# Patient Record
Sex: FEMALE | Race: WHITE | NOT HISPANIC OR LATINO | Employment: OTHER | URBAN - METROPOLITAN AREA
[De-identification: names, ages, dates, MRNs, and addresses within clinical notes are randomized per-mention and may not be internally consistent; named-entity substitution may affect disease eponyms.]

---

## 2017-07-14 LAB
ABO GROUP BLD: NORMAL
BLD GP AB SCN SERPL QL: NORMAL
C TRACH DNA SPEC QL NAA+PROBE: NORMAL
HBV SURFACE AG SERPL QL IA: NORMAL
HCT VFR BLD AUTO: NORMAL %
HGB BLD-MCNC: NORMAL G/DL
HIV 1 0 2 IC ZHVIC: NORMAL
N GONORRHOEA DNA SPEC QL NAA+PROBE: NORMAL
PLATELET # BLD AUTO: NORMAL 10*3/UL
RH BLD: NORMAL
RPR SER QL: NON REACTIVE
RUBV IGG SERPL IA-ACNC: NORMAL

## 2017-10-17 ENCOUNTER — INITIAL PRENATAL (OUTPATIENT)
Dept: OBGYN | Facility: CLINIC | Age: 27
End: 2017-10-17
Payer: MEDICAID

## 2017-10-17 VITALS
BODY MASS INDEX: 24.27 KG/M2 | WEIGHT: 137 LBS | DIASTOLIC BLOOD PRESSURE: 48 MMHG | SYSTOLIC BLOOD PRESSURE: 106 MMHG | HEIGHT: 63 IN

## 2017-10-17 DIAGNOSIS — Z34.90 ENCOUNTER FOR SUPERVISION OF NORMAL PREGNANCY, ANTEPARTUM, UNSPECIFIED GRAVIDITY: Primary | ICD-10-CM

## 2017-10-17 DIAGNOSIS — M62.08 RECTUS DIASTASIS: ICD-10-CM

## 2017-10-17 LAB
APPEARANCE UR: NORMAL
BILIRUB UR STRIP-MCNC: NORMAL MG/DL
COLOR UR AUTO: NORMAL
GLUCOSE UR STRIP.AUTO-MCNC: NEGATIVE MG/DL
KETONES UR STRIP.AUTO-MCNC: NEGATIVE MG/DL
LEUKOCYTE ESTERASE UR QL STRIP.AUTO: NORMAL
NITRITE UR QL STRIP.AUTO: NEGATIVE
PH UR STRIP.AUTO: 6.5 [PH] (ref 5–8)
PROT UR QL STRIP: NORMAL MG/DL
RBC UR QL AUTO: NEGATIVE
SP GR UR STRIP.AUTO: 1.02
UROBILINOGEN UR STRIP-MCNC: NORMAL MG/DL

## 2017-10-17 PROCEDURE — 90471 IMMUNIZATION ADMIN: CPT | Performed by: NURSE PRACTITIONER

## 2017-10-17 PROCEDURE — 59401 PR NEW OB VISIT: CPT | Performed by: NURSE PRACTITIONER

## 2017-10-17 PROCEDURE — 81002 URINALYSIS NONAUTO W/O SCOPE: CPT | Performed by: NURSE PRACTITIONER

## 2017-10-17 PROCEDURE — 90715 TDAP VACCINE 7 YRS/> IM: CPT | Performed by: NURSE PRACTITIONER

## 2017-10-17 ASSESSMENT — ENCOUNTER SYMPTOMS
EYES NEGATIVE: 1
PSYCHIATRIC NEGATIVE: 1
MUSCULOSKELETAL NEGATIVE: 1
NEUROLOGICAL NEGATIVE: 1
CARDIOVASCULAR NEGATIVE: 1
GASTROINTESTINAL NEGATIVE: 1
RESPIRATORY NEGATIVE: 1
CONSTITUTIONAL NEGATIVE: 1

## 2017-10-17 NOTE — LETTER
Cystic Fibrosis Carrier Testing  Celestina Welch    The following information is about a blood test that can be done to determine if you and/or your partner carry the gene for cystic fibrosis.    WHAT IS CYSTIC FIBROSIS?  · Cystic fibrosis (CF) is an inherited disease that affects more than 25,000 [place] children and young adults.  · Symptoms of CF vary but include lung congestion, pneumonia, diarrhea and poor growth.  Most people with CF have severe medical problems and some die at a young age.  Others have so few symptoms they are unaware they have CF.  · CF does not affect intelligence.  · Although there is no cure for CF at this time, scientists are making progress in improving treatment and in searching for a cure.  In the past many people with CF  at a very young age.  Today, many are living into their 20’s and 30’s.    IS THERE A CHANCE MY BABY COULD HAVE CYSTIC FIBROSIS?  · You can have a child with CF even if there is no history in your family (see chart below).  · CF testing can help determine if you are a carrier and at risk to have a child with CF.  Note: if both parents are carriers, there is a 1 in 4 (25%) chance with each pregnancy that they will have a child with CF.  · Carriers have one normal CF gene and one altered CF gene.  · People with CF have two altered CF genes.  · Most people have two normal copies of the CF gene.    Approximate risk that a couple with no family history of cystic fibrosis will have a child with cystic fibrosis:    Ethnic background / Risk     couple:  1 in 2,500   couple:  1 in 15,000            couple:  1 in 8,000     American couple:  1 in 32,000     WHAT TESTING IS AVAILABLE?  · There is a blood test that can be done to find out if you or your partner is a carrier.  · It is important to understand that CF carrier testing does not detect all CF carriers.  · If the test shows that you are both CF carriers, you unborn baby  can be tested to find out if the baby has CF.    HOW MUCH DOES IT COST TO HAVE CYSTIC FIBROSIS CARRIER TESTING?  · Cost and insurance coverage for CF carrier testing vary depending upon the laboratory used and your insurance policy.  · The average cost for CF carrier testing is $300 per person.  · Your genetic counselor can provide you with more information about cystic fibrosis carrier testing.    _____  Yes, I am interested in discussing carrier testing with a genetic counselor.    _____  No, I am not interested in CF carrier testing or in receiving more information about CF carrier testing.      Client signature: ________________________________________  10/17/2017

## 2017-10-17 NOTE — PROGRESS NOTES
S:  Celestina Welch is a 26 y.o.  who presents for her new OB exam.  She is 30w6d with and LC of Estimated Date of Delivery: 17 based off of US . She has no complaints.  She is currently working at home, self-employed. 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 Discussed heavy lifting and chemical exposure. No ER visits. Had care previously with [facility], and was last seen 10/3/2017.     US at 22w3d was grossly normal, but there was question about fetal head size, and recommendation was made by previous physician to follow up for growth. Spoke to Dr Galvez, who is the attending in Clinic today, and she agrees. Growth US ordered to be done at approx 32 weeks.    She has been having constant abdominal burning sensation from her epigastric area to her umbilicus x 2 weeks. Last MD felt that it was heartburn and prescribed tums and zantac. No relief. She says it feels like the muscles are pulling and tearing. Pain is worse in the evenings after she has been up all day long. Ice/cool helps. Exam shows 1.5 fingerbreadth's of diastasis. Comfort measures discussed.    Completed AFP, awaiting records.  Declined CF.  Denies VB, LOF, or cramping.  Denies dysuria, vaginal DC. Reports good fetal movement.     Pt is  and lives with  and kids.  Pregnancy is unplanned but desired.    First 2 pregnancies were uncomplicated full term vaginal deliveries. Largest baby was 7lb1oz.     Third pregnancy was a SAB which she got an infection and needed a D&C for. No problems afterward.      Discussed diet and exercise during pregnancy. Encouraged good nutrition, and daily exercise including walking or swimming. Discussed expected weight gain during pregnancy. Discussed adequate hydration during pregnancy.    Discussed clinic policies and procedures. Educated about number of providers, prenatal visit schedule, and where to go in the event of emergency or need for  "care.    Past Medical History:   Diagnosis Date   • Patellar tendonitis      Family History   Problem Relation Age of Onset   • Diabetes Mother    • Other Father      Social History     Social History   • Marital status:      Spouse name: N/A   • Number of children: N/A   • Years of education: N/A     Occupational History   • Not on file.     Social History Main Topics   • Smoking status: Never Smoker   • Smokeless tobacco: Never Used      Comment: occassional    • Alcohol use No      Comment: occassional    • Drug use: No   • Sexual activity: Yes     Partners: Male     Birth control/ protection: Pill      Comment: Unplanned pregnancy     Other Topics Concern   • Not on file     Social History Narrative   • No narrative on file     OB History    Para Term  AB Living   3 2 2   1 2   SAB TAB Ectopic Molar Multiple Live Births   1       1 2      # Outcome Date GA Lbr Betito/2nd Weight Sex Delivery Anes PTL Lv   3A Term 12 40w0d  3.218 kg (7 lb 1.5 oz) M Vag-Spont EPI N DOTTY      Birth Comments: Pt states no complications   3B Current            2 SAB 09/17/15 10w0d    SAB         Birth Comments: Had D&C   1 Term 14 40w0d  2.977 kg (6 lb 9 oz) F Vag-Spont EPI N DOTTY      Birth Comments: Pt states no complications          History of Varicella Virus: yes  History of HSV I or II in self or partner: no  History of Thyroid problems: no    O:  Blood pressure 106/48, height 1.6 m (5' 3\"), weight 62.1 kg (137 lb), last menstrual period 2017, unknown if currently breastfeeding.   See Prenatal Physical.    Wet mount: Deferred, no s/sx      A:   1.  IUP @ 30w6d per US at 6w1d        2.  S=D        3.  See problem list below        4.  US shows ?small HC/BPD measurements, will repeat growth scan.       Patient Active Problem List    Diagnosis Date Noted   • Normal labor and delivery 2012         P:  1.  GC/CT & pap done at previous clinic        2.  Prenatal labs ordered - lab slip given     " "   3.  Discussed PNV, diet, avoidances and adequate water intake        4.  NOB packet given        5.  Return to office in 2 wks        6.  Complete OB US done previously, growth scan ordered         7.  Tdap today    No orders of the defined types were placed in this encounter.      HPI    Review of Systems   Constitutional: Negative.    HENT: Negative.    Eyes: Negative.    Respiratory: Negative.    Cardiovascular: Negative.    Gastrointestinal: Negative.    Genitourinary: Negative.    Musculoskeletal: Negative.    Skin: Negative.    Neurological: Negative.    Endo/Heme/Allergies: Negative.    Psychiatric/Behavioral: Negative.    All other systems reviewed and are negative.         Objective:     /48   Ht 1.6 m (5' 3\")   Wt 62.1 kg (137 lb)   LMP 02/14/2017   Breastfeeding? Unknown   BMI 24.27 kg/m²      Physical Exam   Constitutional: She is oriented to person, place, and time. She appears well-developed and well-nourished.   HENT:   Head: Normocephalic and atraumatic.   Nose: Nose normal.   Eyes: Conjunctivae and EOM are normal.   Neck: Normal range of motion. Neck supple.   Cardiovascular: Normal rate, regular rhythm, normal heart sounds and intact distal pulses.    Pulmonary/Chest: Effort normal and breath sounds normal.   Abdominal: Soft. Bowel sounds are normal.   Is having constant burning abdominal pain. Not associated with fever, chills, nausea, vomiting, diarrhea, or constipation. Feels like the muscles are tearing apart. Pain is worse in the evenings after she has been moving around all day.  On exam, she has diastasis measuring 1.5 fingerbreadth's.   Genitourinary: Vagina normal. Uterus is enlarged.   Musculoskeletal: Normal range of motion.   Neurological: She is alert and oriented to person, place, and time. She has normal reflexes.   Skin: Skin is warm and dry. Capillary refill takes less than 2 seconds.   Psychiatric: She has a normal mood and affect. Her behavior is normal. Judgment " and thought content normal.   Nursing note and vitals reviewed.       Assessment/Plan:     1. Encounter for supervision of normal pregnancy, antepartum, unspecified   LC 2017 per 6w1d US  - POCT Urinalysis  - GLUCOSE 1HR GESTATIONAL; Future  - HCT; Future  - HGB; Future  - T.PALLIDUM AB EIA; Future  - TDAP VACCINE =>8YO IM  - US-OB LIMITED GROWTH FOLLOW UP; Future

## 2017-10-17 NOTE — PROGRESS NOTES
Pt here today for NOB visit. Transfer of care from [facility].  LMP: Unknown  WT:137 lb  HT: 106/48  Pt states she has been having stomach pain every day x 3 weeks. States no other concerns.   1 hr gtt. H/H, and T.Pallidum lab slip given today with instructions  NEO sheet given and explained today  Flu vaccine offered today. Pt declines.  Desires Tdap vaccine  Declines BTL  Good #[phone]    Tdap vaccine given. Right Deltoid. VIS given and screening check list reviewed with pt.

## 2017-10-26 ENCOUNTER — APPOINTMENT (OUTPATIENT)
Dept: RADIOLOGY | Facility: IMAGING CENTER | Age: 27
End: 2017-10-26
Attending: NURSE PRACTITIONER
Payer: MEDICAID

## 2017-10-26 ENCOUNTER — HOSPITAL ENCOUNTER (OUTPATIENT)
Dept: LAB | Facility: MEDICAL CENTER | Age: 27
End: 2017-10-26
Attending: NURSE PRACTITIONER
Payer: MEDICAID

## 2017-10-26 DIAGNOSIS — Z34.90 ENCOUNTER FOR SUPERVISION OF NORMAL PREGNANCY, ANTEPARTUM, UNSPECIFIED GRAVIDITY: ICD-10-CM

## 2017-10-26 LAB
GLUCOSE 1H P 50 G GLC PO SERPL-MCNC: 110 MG/DL (ref 70–139)
HCT VFR BLD AUTO: 34 % (ref 37–47)
HGB BLD-MCNC: 11.2 G/DL (ref 12–16)
TREPONEMA PALLIDUM IGG+IGM AB [PRESENCE] IN SERUM OR PLASMA BY IMMUNOASSAY: NON REACTIVE

## 2017-10-26 PROCEDURE — 36415 COLL VENOUS BLD VENIPUNCTURE: CPT

## 2017-10-26 PROCEDURE — 85018 HEMOGLOBIN: CPT

## 2017-10-26 PROCEDURE — 82950 GLUCOSE TEST: CPT

## 2017-10-26 PROCEDURE — 86780 TREPONEMA PALLIDUM: CPT

## 2017-10-26 PROCEDURE — 76816 OB US FOLLOW-UP PER FETUS: CPT | Performed by: OBSTETRICS & GYNECOLOGY

## 2017-10-26 PROCEDURE — 85014 HEMATOCRIT: CPT

## 2017-10-31 ENCOUNTER — DATING (OUTPATIENT)
Dept: OBGYN | Facility: CLINIC | Age: 27
End: 2017-10-31

## 2017-11-01 ENCOUNTER — ROUTINE PRENATAL (OUTPATIENT)
Dept: OBGYN | Facility: CLINIC | Age: 27
End: 2017-11-01
Payer: MEDICAID

## 2017-11-01 VITALS — WEIGHT: 141 LBS | SYSTOLIC BLOOD PRESSURE: 112 MMHG | BODY MASS INDEX: 24.98 KG/M2 | DIASTOLIC BLOOD PRESSURE: 72 MMHG

## 2017-11-01 DIAGNOSIS — Z34.80 SUPERVISION OF OTHER NORMAL PREGNANCY, ANTEPARTUM: Primary | ICD-10-CM

## 2017-11-01 PROCEDURE — 90040 PR PRENATAL FOLLOW UP: CPT | Performed by: NURSE PRACTITIONER

## 2017-11-01 ASSESSMENT — PATIENT HEALTH QUESTIONNAIRE - PHQ9: CLINICAL INTERPRETATION OF PHQ2 SCORE: 0

## 2017-11-01 NOTE — PROGRESS NOTES
Ob f/u. + fetal movement baby is moving ok   No VB, LOF or contractions   C/O ebony and jan  Phone number # [phone]  Pharmacy verified with patient  WT= 141 lbs             UP=956/72  Pt refuse flu shot

## 2017-11-01 NOTE — PROGRESS NOTES
S) Pt is a 26 y.o.   at 33w0d  gestation. Routine prenatal care today. Still having some pain from the rectus diastasis. She is getting some relief from pregnancy clothing with some support. Discussed comfort measures again, and will refer to surgeon after delivery if still present.   Fetal movement Normal  Cramping no,  VB no  LOF no   Denies dysuria. Generally feels well today. Good self-care activities identified. Denies headaches, swelling, visual changes, or epigastric pain .     O) Blood pressure 112/72, weight 64 kg (141 lb), last menstrual period 2017, unknown if currently breastfeeding.        Labs:       PNL: WNL       GCT: 110       AFP: Not done       GBS: N/A       Pertinent ultrasound -        See media, all WNL. Growth US last week WNl, DAYLIN 19.04cm, c/w dating      A) IUP at 33w0d       S=D         Patient Active Problem List    Diagnosis Date Noted   • Encounter for supervision of normal pregnancy, antepartum 10/17/2017   • Rectus diastasis 10/17/2017                 TDAP: yes       FLU: no        BTL: no       : n/a    P) s/s ptl vs general discomforts. Fetal movements reviewed. General ed and anticipatory guidance. Nutrition/exercise/vitamin. Plans breast Plans pp contraception-  had vasectomy  Continue PNV.

## 2017-11-15 ENCOUNTER — ROUTINE PRENATAL (OUTPATIENT)
Dept: OBGYN | Facility: CLINIC | Age: 27
End: 2017-11-15
Payer: MEDICAID

## 2017-11-15 ENCOUNTER — HOSPITAL ENCOUNTER (OUTPATIENT)
Facility: MEDICAL CENTER | Age: 27
End: 2017-11-15
Attending: NURSE PRACTITIONER
Payer: MEDICAID

## 2017-11-15 VITALS — SYSTOLIC BLOOD PRESSURE: 102 MMHG | DIASTOLIC BLOOD PRESSURE: 68 MMHG | BODY MASS INDEX: 25.33 KG/M2 | WEIGHT: 143 LBS

## 2017-11-15 DIAGNOSIS — Z34.80 SUPERVISION OF OTHER NORMAL PREGNANCY, ANTEPARTUM: Primary | ICD-10-CM

## 2017-11-15 DIAGNOSIS — Z34.80 SUPERVISION OF OTHER NORMAL PREGNANCY, ANTEPARTUM: ICD-10-CM

## 2017-11-15 PROCEDURE — 90040 PR PRENATAL FOLLOW UP: CPT | Performed by: NURSE PRACTITIONER

## 2017-11-15 PROCEDURE — 87653 STREP B DNA AMP PROBE: CPT

## 2017-11-15 NOTE — PROGRESS NOTES
Pt. here for Ob f/u and GBS today. Good # [phone]  Good FM  Pt states was having [place] last night along with nausea and dizziness.   Pharmacy verified.

## 2017-11-15 NOTE — PROGRESS NOTES
S) Pt is a 26 y.o.   at 35w0d  gestation. Routine prenatal care today. Complains of strong contractions and N/V last night. Has since resolved itself. GBS collected today. Still uncomfortable with her abdominal muscles pulling. Discussed possible IOL close to 40 weeks and then referral to surgeon after pregnancy for abdominoplasty if desired/needed. All questions answered.  Fetal movement Normal  Cramping yes,  VB no  LOF no   Denies dysuria. Generally feels well today. Good self-care activities identified. Denies headaches, swelling, visual changes, or epigastric pain .     O) Last menstrual period 2017, unknown if currently breastfeeding.        Labs:       PNL: WNL       GCT: 110       AFP: Not done       GBS: Collected today       Pertinent ultrasound -        17- Done at [facility] (see media), all WNL, DAYLIN WNL, c/w prev dating.  10/26/17- Recheck fetal growth (head)- Survey WNL, DAYLIN 19.04cm, c/w prev dating.    A) IUP at 35w0d       S=D         Patient Active Problem List    Diagnosis Date Noted   • Encounter for supervision of normal pregnancy, antepartum 10/17/2017   • Rectus diastasis 10/17/2017                 TDAP: yes       FLU: no        BTL: no       : n/a    P) s/s ptl vs general discomforts. Fetal movements reviewed. General ed and anticipatory guidance. Nutrition/exercise/vitamin. Plans breast Plans pp contraception-  had vasectomy  Continue PNV.

## 2017-11-16 LAB — GP B STREP DNA SPEC QL NAA+PROBE: POSITIVE

## 2017-11-17 PROBLEM — O99.820 GBS (GROUP B STREPTOCOCCUS CARRIER), +RV CULTURE, CURRENTLY PREGNANT: Status: ACTIVE | Noted: 2017-11-17

## 2017-11-22 ENCOUNTER — ROUTINE PRENATAL (OUTPATIENT)
Dept: OBGYN | Facility: CLINIC | Age: 27
End: 2017-11-22
Payer: MEDICAID

## 2017-11-22 VITALS — SYSTOLIC BLOOD PRESSURE: 102 MMHG | DIASTOLIC BLOOD PRESSURE: 68 MMHG | WEIGHT: 144 LBS | BODY MASS INDEX: 25.51 KG/M2

## 2017-11-22 DIAGNOSIS — O99.820 GBS (GROUP B STREPTOCOCCUS CARRIER), +RV CULTURE, CURRENTLY PREGNANT: ICD-10-CM

## 2017-11-22 PROCEDURE — 90040 PR PRENATAL FOLLOW UP: CPT | Performed by: NURSE PRACTITIONER

## 2017-11-22 NOTE — PROGRESS NOTES
Ob f/u. + fetal movement baby is moving ok   No VB, LOF or contractions   C/O ebony  and jan   Phone number # [phone]  Pharmacy verified with patient  WT= 144 lbs              BP= 102/68

## 2017-11-22 NOTE — PROGRESS NOTES
S) Pt is a 26 y.o.   at 36w0d  gestation. Routine prenatal care today. Complains of irregular contractions. Is driving a few hours today for Thanksgiving. Precautions discussed, recommend that she take prenatal records with her. Will be back .     Fetal movement Normal  Cramping no,  VB no  LOF no   Denies dysuria. Generally feels well today. Good self-care activities identified. Denies headaches, swelling, visual changes, or epigastric pain .     O) Blood pressure 102/68, weight 65.3 kg (144 lb), last menstrual period 2017, unknown if currently breastfeeding.        Labs:       PNL: WNL       GCT: 110       AFP: Not done       GBS: positive       Pertinent ultrasound -        Done at [facility]- WNL, small head, DAYLIN WNL, c/w dates  10/26/17- Recheck growth and fetal head- all WNL, DAYLIN 19.04cm, c/w prev dating    A) IUP at 36w0d       S=D         Patient Active Problem List    Diagnosis Date Noted   • Encounter for supervision of normal pregnancy, antepartum 10/17/2017     Priority: Medium   • Rectus diastasis 10/17/2017     Priority: Medium   • GBS (group B Streptococcus carrier), +RV culture, currently pregnant 2017                 TDAP: yes       FLU: no        BTL: no       : n/a    P) s/s ptl vs general discomforts. Fetal movements reviewed. General ed and anticipatory guidance. Nutrition/exercise/vitamin. Plans breast Plans pp contraception-  had vasectomy  Continue PNV.

## 2017-11-30 ENCOUNTER — ROUTINE PRENATAL (OUTPATIENT)
Dept: OBGYN | Facility: CLINIC | Age: 27
End: 2017-11-30
Payer: MEDICAID

## 2017-11-30 VITALS — WEIGHT: 144 LBS | BODY MASS INDEX: 25.51 KG/M2 | SYSTOLIC BLOOD PRESSURE: 102 MMHG | DIASTOLIC BLOOD PRESSURE: 64 MMHG

## 2017-11-30 DIAGNOSIS — O99.820 GBS (GROUP B STREPTOCOCCUS CARRIER), +RV CULTURE, CURRENTLY PREGNANT: ICD-10-CM

## 2017-11-30 PROCEDURE — 90040 PR PRENATAL FOLLOW UP: CPT | Performed by: NURSE PRACTITIONER

## 2017-11-30 ASSESSMENT — PATIENT HEALTH QUESTIONNAIRE - PHQ9: CLINICAL INTERPRETATION OF PHQ2 SCORE: 0

## 2017-11-30 NOTE — PROGRESS NOTES
SUBJECTIVE:  Pt is a 26 y.o.   at 37w1d  gestation. Presents today for follow-up prenatal care. Reports no issues at this time.  Reports good  fetal movement. Denies cramping/contractions, bleeding or leaking of fluid. Denies dysuria, headaches, N/V, or other issues at this time. Generally feels well today.     OBJECTIVE:  - See prenatal vitals flow  Vitals:    17 1411   BP: 102/64   Weight: 65.3 kg (144 lb)      - Pertinent Labs: GBS positive   - Pertinent ultrasound: Growth US showed adequate growth            ASSESSMENT:   - IUP at 37w1d by 6  week US   - S=D  Patient Active Problem List    Diagnosis Date Noted   • Encounter for supervision of normal pregnancy, antepartum 10/17/2017     Priority: Medium   • Rectus diastasis 10/17/2017     Priority: Medium   • GBS (group B Streptococcus carrier), +RV culture, currently pregnant 2017         PLAN:  - Encouraged nightly walks  - S/sx pregnancy and labor warning signs vs general discomforts discussed  - Fetal movements and kick counts reviewed   - Adequate hydration reinforced  - Nutrition/exercise/vitamin education: continued PNV  -  s/p vasectomy for contraception Pp: handout given and reviewed  - S/p TDAP vacc  - Declines Flu vacc  - Anticipatory guidance given  - RTC in 1 week for follow-up prenatal care

## 2017-11-30 NOTE — PROGRESS NOTES
Ob f/u. + fetal movement baby is moving ok   No VB, LOF or contractions   C/O ebony and jan   Phone number # [phone]  Pharmacy verified with patient  WT= 144 lbs             CK=175/64

## 2017-12-06 ENCOUNTER — ROUTINE PRENATAL (OUTPATIENT)
Dept: OBGYN | Facility: CLINIC | Age: 27
End: 2017-12-06
Payer: MEDICAID

## 2017-12-06 VITALS — BODY MASS INDEX: 26.22 KG/M2 | DIASTOLIC BLOOD PRESSURE: 60 MMHG | WEIGHT: 148 LBS | SYSTOLIC BLOOD PRESSURE: 114 MMHG

## 2017-12-06 DIAGNOSIS — O99.820 GBS (GROUP B STREPTOCOCCUS CARRIER), +RV CULTURE, CURRENTLY PREGNANT: ICD-10-CM

## 2017-12-06 DIAGNOSIS — Z34.80 SUPERVISION OF OTHER NORMAL PREGNANCY, ANTEPARTUM: Primary | ICD-10-CM

## 2017-12-06 PROCEDURE — 90040 PR PRENATAL FOLLOW UP: CPT | Performed by: NURSE PRACTITIONER

## 2017-12-06 NOTE — PROGRESS NOTES
OB f/u. + fetal movement.  No VB, LOF   Good phone # [phone]  Preferred pharmacy confirmed.  GBS positive  Pt c/o bilateral swollen feet; R leg has been swelling up more than L  Pt reports irregular UC

## 2017-12-06 NOTE — PROGRESS NOTES
S) Pt is a 26 y.o.   at 38w0d  gestation. Routine prenatal care today. Complains of irregular UC's. Also says her legs and feet have been swelling in the evening with the right being more swollen than the left. Denies any pain or heat in either leg. Negative homans bilaterally. No other PIH symptoms. IOL referral placed today and discussed.    Fetal movement Normal  Cramping yes,  VB no  LOF no   Denies dysuria. Generally feels well today. Good self-care activities identified. Denies headaches, swelling, visual changes, or epigastric pain .     O) Blood pressure 114/60, weight 67.1 kg (148 lb), last menstrual period 2017, unknown if currently breastfeeding.        Labs:       PNL: WNL       GCT: 110       AFP: Not done       GBS: positive       Pertinent ultrasound -        First US done at [facility], see media- ?small head circumference.  /- Growth US done for head measurements- Survey WNL, DAYLIN 19.04cm, growth is consistent and no concerns for small head diameter    A) IUP at 38w0d       S=D         Patient Active Problem List    Diagnosis Date Noted   • GBS (group B Streptococcus carrier), +RV culture, currently pregnant 2017     Priority: High   • Encounter for supervision of normal pregnancy, antepartum 10/17/2017     Priority: Medium   • Rectus diastasis 10/17/2017     Priority: Medium                 TDAP: yes       FLU: no        BTL: no       : n/a    P) s/s ptl vs general discomforts. Fetal movements reviewed. General ed and anticipatory guidance. Nutrition/exercise/vitamin. Plans breast Plans pp contraception- - vasectomy  Continue PNV.

## 2017-12-12 ENCOUNTER — ROUTINE PRENATAL (OUTPATIENT)
Dept: OBGYN | Facility: CLINIC | Age: 27
End: 2017-12-12
Payer: MEDICAID

## 2017-12-12 VITALS — BODY MASS INDEX: 26.57 KG/M2 | WEIGHT: 150 LBS | DIASTOLIC BLOOD PRESSURE: 70 MMHG | SYSTOLIC BLOOD PRESSURE: 114 MMHG

## 2017-12-12 DIAGNOSIS — O99.820 GBS (GROUP B STREPTOCOCCUS CARRIER), +RV CULTURE, CURRENTLY PREGNANT: ICD-10-CM

## 2017-12-12 PROCEDURE — 90040 PR PRENATAL FOLLOW UP: CPT | Performed by: PHYSICIAN ASSISTANT

## 2017-12-12 NOTE — PROGRESS NOTES
Pt here for OB/FU Reports Good Fetal Movement.  Pt c/o linnea U/Cs, denies any other complications.   IOL and GEL information given today.   GBS POSITIVE   # [phone]

## 2017-12-12 NOTE — PROGRESS NOTES
Pt has no complaints with cramping, UCs, Vb, LOF, though pt has incr pain in pelvis. +FM. GBS pos. Cervix: 1/50/-2, post, soft, vtx. Labor precautions stressed and Daily FKC and walks recommended. Pt has IOL scheduled on 12/19 at 8am (called L&D to reschedule as pt was hoping to be with Barb). Unable to move OP Gel as too full on 12/18, so will have pt just have IOL on 12/19. Pt given info and aware. RTC prn or to L&D 12/19.

## 2017-12-19 ENCOUNTER — HOSPITAL ENCOUNTER (INPATIENT)
Facility: MEDICAL CENTER | Age: 27
LOS: 2 days | End: 2017-12-21
Attending: OBSTETRICS & GYNECOLOGY | Admitting: OBSTETRICS & GYNECOLOGY
Payer: MEDICAID

## 2017-12-19 DIAGNOSIS — M62.08 RECTUS DIASTASIS: ICD-10-CM

## 2017-12-19 PROBLEM — Z34.90 PREGNANCY: Status: ACTIVE | Noted: 2017-12-19

## 2017-12-19 LAB
BASOPHILS # BLD AUTO: 0.4 % (ref 0–1.8)
BASOPHILS # BLD: 0.04 K/UL (ref 0–0.12)
EOSINOPHIL # BLD AUTO: 0.09 K/UL (ref 0–0.51)
EOSINOPHIL NFR BLD: 0.9 % (ref 0–6.9)
ERYTHROCYTE [DISTWIDTH] IN BLOOD BY AUTOMATED COUNT: 41 FL (ref 35.9–50)
HCT VFR BLD AUTO: 36.3 % (ref 37–47)
HGB BLD-MCNC: 11.9 G/DL (ref 12–16)
HOLDING TUBE BB 8507: NORMAL
IMM GRANULOCYTES # BLD AUTO: 0.13 K/UL (ref 0–0.11)
IMM GRANULOCYTES NFR BLD AUTO: 1.2 % (ref 0–0.9)
LYMPHOCYTES # BLD AUTO: 1.64 K/UL (ref 1–4.8)
LYMPHOCYTES NFR BLD: 15.7 % (ref 22–41)
MCH RBC QN AUTO: 27.6 PG (ref 27–33)
MCHC RBC AUTO-ENTMCNC: 32.8 G/DL (ref 33.6–35)
MCV RBC AUTO: 84.2 FL (ref 81.4–97.8)
MONOCYTES # BLD AUTO: 0.7 K/UL (ref 0–0.85)
MONOCYTES NFR BLD AUTO: 6.7 % (ref 0–13.4)
NEUTROPHILS # BLD AUTO: 7.83 K/UL (ref 2–7.15)
NEUTROPHILS NFR BLD: 75.1 % (ref 44–72)
NRBC # BLD AUTO: 0 K/UL
NRBC BLD-RTO: 0 /100 WBC
PLATELET # BLD AUTO: 285 K/UL (ref 164–446)
PMV BLD AUTO: 10.2 FL (ref 9–12.9)
RBC # BLD AUTO: 4.31 M/UL (ref 4.2–5.4)
WBC # BLD AUTO: 10.4 K/UL (ref 4.8–10.8)

## 2017-12-19 PROCEDURE — 10907ZC DRAINAGE OF AMNIOTIC FLUID, THERAPEUTIC FROM PRODUCTS OF CONCEPTION, VIA NATURAL OR ARTIFICIAL OPENING: ICD-10-PCS | Performed by: OBSTETRICS & GYNECOLOGY

## 2017-12-19 PROCEDURE — 304965 HCHG RECOVERY SERVICES

## 2017-12-19 PROCEDURE — 700102 HCHG RX REV CODE 250 W/ 637 OVERRIDE(OP): Performed by: STUDENT IN AN ORGANIZED HEALTH CARE EDUCATION/TRAINING PROGRAM

## 2017-12-19 PROCEDURE — 36415 COLL VENOUS BLD VENIPUNCTURE: CPT

## 2017-12-19 PROCEDURE — 4A1HX4Z MONITORING OF PRODUCTS OF CONCEPTION, CARDIAC ELECTRICAL ACTIVITY, EXTERNAL APPROACH: ICD-10-PCS | Performed by: OBSTETRICS & GYNECOLOGY

## 2017-12-19 PROCEDURE — A9270 NON-COVERED ITEM OR SERVICE: HCPCS | Performed by: STUDENT IN AN ORGANIZED HEALTH CARE EDUCATION/TRAINING PROGRAM

## 2017-12-19 PROCEDURE — 770002 HCHG ROOM/CARE - OB PRIVATE (112)

## 2017-12-19 PROCEDURE — 700105 HCHG RX REV CODE 258

## 2017-12-19 PROCEDURE — 700111 HCHG RX REV CODE 636 W/ 250 OVERRIDE (IP): Performed by: STUDENT IN AN ORGANIZED HEALTH CARE EDUCATION/TRAINING PROGRAM

## 2017-12-19 PROCEDURE — 3E033VJ INTRODUCTION OF OTHER HORMONE INTO PERIPHERAL VEIN, PERCUTANEOUS APPROACH: ICD-10-PCS | Performed by: OBSTETRICS & GYNECOLOGY

## 2017-12-19 PROCEDURE — 303615 HCHG EPIDURAL/SPINAL ANESTHESIA FOR LABOR

## 2017-12-19 PROCEDURE — 700105 HCHG RX REV CODE 258: Performed by: OBSTETRICS & GYNECOLOGY

## 2017-12-19 PROCEDURE — 59409 OBSTETRICAL CARE: CPT

## 2017-12-19 PROCEDURE — 85025 COMPLETE CBC W/AUTO DIFF WBC: CPT

## 2017-12-19 PROCEDURE — 700111 HCHG RX REV CODE 636 W/ 250 OVERRIDE (IP)

## 2017-12-19 RX ORDER — SODIUM CHLORIDE, SODIUM LACTATE, POTASSIUM CHLORIDE, CALCIUM CHLORIDE 600; 310; 30; 20 MG/100ML; MG/100ML; MG/100ML; MG/100ML
INJECTION, SOLUTION INTRAVENOUS
Status: ACTIVE
Start: 2017-12-19 | End: 2017-12-20

## 2017-12-19 RX ORDER — AMPICILLIN 2 G/1
INJECTION, POWDER, FOR SOLUTION INTRAVENOUS
Status: COMPLETED
Start: 2017-12-19 | End: 2017-12-19

## 2017-12-19 RX ORDER — ROPIVACAINE HYDROCHLORIDE 2 MG/ML
INJECTION, SOLUTION EPIDURAL; INFILTRATION; PERINEURAL
Status: COMPLETED
Start: 2017-12-19 | End: 2017-12-19

## 2017-12-19 RX ORDER — ONDANSETRON 2 MG/ML
4 INJECTION INTRAMUSCULAR; INTRAVENOUS EVERY 6 HOURS PRN
Status: DISCONTINUED | OUTPATIENT
Start: 2017-12-19 | End: 2017-12-21 | Stop reason: HOSPADM

## 2017-12-19 RX ORDER — HYDROCODONE BITARTRATE AND ACETAMINOPHEN 10; 325 MG/1; MG/1
1 TABLET ORAL EVERY 4 HOURS PRN
Status: DISCONTINUED | OUTPATIENT
Start: 2017-12-19 | End: 2017-12-21 | Stop reason: HOSPADM

## 2017-12-19 RX ORDER — ONDANSETRON 4 MG/1
4 TABLET, ORALLY DISINTEGRATING ORAL EVERY 6 HOURS PRN
Status: DISCONTINUED | OUTPATIENT
Start: 2017-12-19 | End: 2017-12-21 | Stop reason: HOSPADM

## 2017-12-19 RX ORDER — AMPICILLIN 2 G/1
2 INJECTION, POWDER, FOR SOLUTION INTRAVENOUS ONCE
Status: COMPLETED | OUTPATIENT
Start: 2017-12-19 | End: 2017-12-19

## 2017-12-19 RX ORDER — MISOPROSTOL 200 UG/1
600 TABLET ORAL
Status: DISCONTINUED | OUTPATIENT
Start: 2017-12-19 | End: 2017-12-21 | Stop reason: HOSPADM

## 2017-12-19 RX ORDER — DOCUSATE SODIUM 100 MG/1
100 CAPSULE, LIQUID FILLED ORAL 2 TIMES DAILY PRN
Status: DISCONTINUED | OUTPATIENT
Start: 2017-12-19 | End: 2017-12-21 | Stop reason: HOSPADM

## 2017-12-19 RX ORDER — OXYCODONE HYDROCHLORIDE AND ACETAMINOPHEN 5; 325 MG/1; MG/1
1 TABLET ORAL EVERY 4 HOURS PRN
Status: DISCONTINUED | OUTPATIENT
Start: 2017-12-19 | End: 2017-12-21 | Stop reason: HOSPADM

## 2017-12-19 RX ORDER — CARBOPROST TROMETHAMINE 250 UG/ML
250 INJECTION, SOLUTION INTRAMUSCULAR
Status: DISCONTINUED | OUTPATIENT
Start: 2017-12-19 | End: 2017-12-21 | Stop reason: HOSPADM

## 2017-12-19 RX ORDER — BUPIVACAINE HYDROCHLORIDE 2.5 MG/ML
INJECTION, SOLUTION EPIDURAL; INFILTRATION; INTRACAUDAL
Status: ACTIVE
Start: 2017-12-19 | End: 2017-12-20

## 2017-12-19 RX ORDER — MISOPROSTOL 200 UG/1
800 TABLET ORAL
Status: DISCONTINUED | OUTPATIENT
Start: 2017-12-19 | End: 2017-12-19 | Stop reason: HOSPADM

## 2017-12-19 RX ORDER — METHYLERGONOVINE MALEATE 0.2 MG/ML
0.2 INJECTION INTRAVENOUS
Status: DISCONTINUED | OUTPATIENT
Start: 2017-12-19 | End: 2017-12-21 | Stop reason: HOSPADM

## 2017-12-19 RX ORDER — CALCIUM CARBONATE 500 MG/1
1000 TABLET, CHEWABLE ORAL 4 TIMES DAILY PRN
Status: DISCONTINUED | OUTPATIENT
Start: 2017-12-19 | End: 2017-12-21 | Stop reason: HOSPADM

## 2017-12-19 RX ORDER — SODIUM CHLORIDE, SODIUM LACTATE, POTASSIUM CHLORIDE, CALCIUM CHLORIDE 600; 310; 30; 20 MG/100ML; MG/100ML; MG/100ML; MG/100ML
INJECTION, SOLUTION INTRAVENOUS
Status: COMPLETED
Start: 2017-12-19 | End: 2017-12-19

## 2017-12-19 RX ORDER — IBUPROFEN 600 MG/1
600 TABLET ORAL EVERY 6 HOURS PRN
Status: DISCONTINUED | OUTPATIENT
Start: 2017-12-19 | End: 2017-12-21 | Stop reason: HOSPADM

## 2017-12-19 RX ORDER — METHYLERGONOVINE MALEATE 0.2 MG/ML
0.2 INJECTION INTRAVENOUS
Status: DISCONTINUED | OUTPATIENT
Start: 2017-12-19 | End: 2017-12-19 | Stop reason: HOSPADM

## 2017-12-19 RX ORDER — ACETAMINOPHEN 325 MG/1
325 TABLET ORAL EVERY 4 HOURS PRN
Status: DISCONTINUED | OUTPATIENT
Start: 2017-12-19 | End: 2017-12-21 | Stop reason: HOSPADM

## 2017-12-19 RX ORDER — AMPICILLIN 1 G/1
1 INJECTION, POWDER, FOR SOLUTION INTRAMUSCULAR; INTRAVENOUS EVERY 4 HOURS
Status: DISCONTINUED | OUTPATIENT
Start: 2017-12-19 | End: 2017-12-19 | Stop reason: HOSPADM

## 2017-12-19 RX ORDER — SODIUM CHLORIDE, SODIUM LACTATE, POTASSIUM CHLORIDE, CALCIUM CHLORIDE 600; 310; 30; 20 MG/100ML; MG/100ML; MG/100ML; MG/100ML
INJECTION, SOLUTION INTRAVENOUS CONTINUOUS
Status: DISCONTINUED | OUTPATIENT
Start: 2017-12-19 | End: 2017-12-21 | Stop reason: HOSPADM

## 2017-12-19 RX ORDER — VITAMIN A ACETATE, BETA CAROTENE, ASCORBIC ACID, CHOLECALCIFEROL, .ALPHA.-TOCOPHEROL ACETATE, DL-, THIAMINE MONONITRATE, RIBOFLAVIN, NIACINAMIDE, PYRIDOXINE HYDROCHLORIDE, FOLIC ACID, CYANOCOBALAMIN, CALCIUM CARBONATE, FERROUS FUMARATE, ZINC OXIDE, CUPRIC OXIDE 3080; 12; 120; 400; 1; 1.84; 3; 20; 22; 920; 25; 200; 27; 10; 2 [IU]/1; UG/1; MG/1; [IU]/1; MG/1; MG/1; MG/1; MG/1; MG/1; [IU]/1; MG/1; MG/1; MG/1; MG/1; MG/1
1 TABLET, FILM COATED ORAL EVERY MORNING
Status: DISCONTINUED | OUTPATIENT
Start: 2017-12-20 | End: 2017-12-21 | Stop reason: HOSPADM

## 2017-12-19 RX ORDER — ALUMINA, MAGNESIA, AND SIMETHICONE 2400; 2400; 240 MG/30ML; MG/30ML; MG/30ML
30 SUSPENSION ORAL EVERY 6 HOURS PRN
Status: DISCONTINUED | OUTPATIENT
Start: 2017-12-19 | End: 2017-12-19 | Stop reason: HOSPADM

## 2017-12-19 RX ORDER — SODIUM CHLORIDE, SODIUM LACTATE, POTASSIUM CHLORIDE, CALCIUM CHLORIDE 600; 310; 30; 20 MG/100ML; MG/100ML; MG/100ML; MG/100ML
INJECTION, SOLUTION INTRAVENOUS PRN
Status: DISCONTINUED | OUTPATIENT
Start: 2017-12-19 | End: 2017-12-21 | Stop reason: HOSPADM

## 2017-12-19 RX ADMIN — ROPIVACAINE HYDROCHLORIDE 100 ML: 2 INJECTION, SOLUTION EPIDURAL; INFILTRATION at 13:58

## 2017-12-19 RX ADMIN — SODIUM CHLORIDE, POTASSIUM CHLORIDE, SODIUM LACTATE AND CALCIUM CHLORIDE: 600; 310; 30; 20 INJECTION, SOLUTION INTRAVENOUS at 14:03

## 2017-12-19 RX ADMIN — SODIUM CHLORIDE, POTASSIUM CHLORIDE, SODIUM LACTATE AND CALCIUM CHLORIDE 1000 ML: 600; 310; 30; 20 INJECTION, SOLUTION INTRAVENOUS at 10:30

## 2017-12-19 RX ADMIN — SODIUM CHLORIDE, POTASSIUM CHLORIDE, SODIUM LACTATE AND CALCIUM CHLORIDE: 600; 310; 30; 20 INJECTION, SOLUTION INTRAVENOUS at 13:52

## 2017-12-19 RX ADMIN — AMPICILLIN SODIUM 1 G: 1 INJECTION, POWDER, FOR SOLUTION INTRAMUSCULAR; INTRAVENOUS at 15:15

## 2017-12-19 RX ADMIN — IBUPROFEN 600 MG: 600 TABLET, FILM COATED ORAL at 20:21

## 2017-12-19 RX ADMIN — Medication 1 MILLI-UNITS/MIN: at 11:04

## 2017-12-19 RX ADMIN — Medication 125 ML/HR: at 20:21

## 2017-12-19 RX ADMIN — AMPICILLIN 2 G: 2 INJECTION, POWDER, FOR SOLUTION INTRAVENOUS at 11:06

## 2017-12-19 RX ADMIN — AMPICILLIN SODIUM 2 G: 2 INJECTION, POWDER, FOR SOLUTION INTRAMUSCULAR; INTRAVENOUS at 11:06

## 2017-12-19 ASSESSMENT — PAIN SCALES - GENERAL
PAINLEVEL_OUTOF10: 0
PAINLEVEL_OUTOF10: 2
PAINLEVEL_OUTOF10: 1

## 2017-12-19 ASSESSMENT — LIFESTYLE VARIABLES
EVER_SMOKED: NEVER
DO YOU DRINK ALCOHOL: NO
ALCOHOL_USE: NO

## 2017-12-19 ASSESSMENT — PATIENT HEALTH QUESTIONNAIRE - PHQ9
SUM OF ALL RESPONSES TO PHQ QUESTIONS 1-9: 0
SUM OF ALL RESPONSES TO PHQ9 QUESTIONS 1 AND 2: 0
2. FEELING DOWN, DEPRESSED, IRRITABLE, OR HOPELESS: NOT AT ALL
1. LITTLE INTEREST OR PLEASURE IN DOING THINGS: NOT AT ALL

## 2017-12-19 ASSESSMENT — COPD QUESTIONNAIRES
DO YOU EVER COUGH UP ANY MUCUS OR PHLEGM?: NO/ONLY WITH OCCASIONAL COLDS OR INFECTIONS
COPD SCREENING SCORE: 0
HAVE YOU SMOKED AT LEAST 100 CIGARETTES IN YOUR ENTIRE LIFE: NO/DON'T KNOW
DURING THE PAST 4 WEEKS HOW MUCH DID YOU FEEL SHORT OF BREATH: NONE/LITTLE OF THE TIME

## 2017-12-19 NOTE — PROGRESS NOTES
1015:  with EDC of 12/2o making her 39.6 presents for elective IOL; denies UCs, LOF, or VB; reports good FM. Admission procedures completed. /  1100: Pitocin started  1350; Epidural placed  1545: Pt feels need to push; C/0  1555; AROM with clr fluid; pt does not feel need to push currently; will labor down  1730: Began pushing with pt; pt does not push adequately d/t dense epidural on left side; requires much coaching  :  of viable female, 8/9 APGAR  : Assisted to bathroom to void; able to void sufficient amount. Transferred to  via w/c in stable condition

## 2017-12-19 NOTE — H&P
History and Physical      Celestina Welch is a 27 y.o. year old female  at 39w6d who presents for elective induction of labor.     Subjective:   positive  For CTXS.   negative Feels pain   negative for LOF  negative for vaginal bleeding.   positive for fetal movement    ROS: A comprehensive review of systems was negative.    Past Medical History:   Diagnosis Date   • Patellar tendonitis    • Rectus diastasis 10/17/2017     History reviewed. No pertinent surgical history.  OB History    Para Term  AB Living   3 2 2   1 2   SAB TAB Ectopic Molar Multiple Live Births   1       1 2      # Outcome Date GA Lbr Betito/2nd Weight Sex Delivery Anes PTL Lv   3A Term 12 40w0d  3.218 kg (7 lb 1.5 oz) M Vag-Spont EPI N DOTTY      Birth Comments: Pt states no complications   3B Current            2 SAB 09/17/15 10w0d    SAB         Birth Comments: Had D&C   1 Term 14 40w0d  2.977 kg (6 lb 9 oz) F Vag-Spont EPI N DOTTY      Birth Comments: Pt states no complications        Social History     Social History   • Marital status:      Spouse name: N/A   • Number of children: N/A   • Years of education: N/A     Occupational History   • Not on file.     Social History Main Topics   • Smoking status: Never Smoker   • Smokeless tobacco: Never Used      Comment: occassional    • Alcohol use No      Comment: occassional    • Drug use: No   • Sexual activity: Yes     Partners: Male     Birth control/ protection: Pill      Comment: Unplanned pregnancy     Other Topics Concern   • Not on file     Social History Narrative   • No narrative on file     Allergies: Patient has no known allergies.    Current Facility-Administered Medications:   •  AMPICILLIN SODIUM 2 G INJ SOLR, , , ,   •  LACTATED RINGERS IV SOLN, , , ,   •  fentaNYL (SUBLIMAZE) injection 50 mcg, 50 mcg, Intravenous, Q HOUR PRN, Jelly Goldstein M.D.  •  fentaNYL (SUBLIMAZE) injection 100 mcg, 100 mcg, Intravenous, Q HOUR PRN, Jelly Goldstein  "M.D.  •  mag hydrox-al hydrox-simeth (MAALOX PLUS ES or MYLANTA DS) suspension 30 mL, 30 mL, Oral, Q6HRS PRN, Jelly Goldstein M.D.  •  ampicillin (OMNIPEN) injection 2 g, 2 g, Intravenous, Once **FOLLOWED BY** ampicillin (OMNIPEN) injection 1 g, 1 g, Intravenous, Q4HRS, Jelly Goldstein M.D.  •  misoprostol (CYTOTEC) tablet 800 mcg, 800 mcg, Rectal, Once PRN, Jelly Goldstein M.D.  •  methylergonovine (METHERGINE) injection 0.2 mg, 0.2 mg, Intramuscular, Once PRN, Jelly Goldstein M.D.  •  oxytocin (PITOCIN) infusion (for induction), 0.5-20 haley-units/min, Intravenous, Continuous, Jelly Goldstein M.D.  •  lactated ringers infusion, , Intravenous, Continuous, Nathalia Diamond M.D.    Prenatal care with [facility] starting at 6w1d with the following problems:  Patient Active Problem List    Diagnosis Date Noted   • GBS (group B Streptococcus carrier), +RV culture, currently pregnant 11/17/2017     Priority: High   • Encounter for supervision of normal pregnancy, antepartum 10/17/2017     Priority: Medium   • Rectus diastasis 10/17/2017     Priority: Medium   • Pregnancy 12/19/2017       Objective:      Blood pressure 122/73, pulse (!) 103, height 1.6 m (5' 3\"), weight 68 kg (150 lb), last menstrual period 02/14/2017, unknown if currently breastfeeding.    General:   no acute distress, alert   Skin:   normal   HEENT:  PERRLA and EOMI   Lungs:   CTA bilateral   Heart:   S1, S2 normal, no murmur, click, rub or gallop, regular rate and rhythm, chest is clear without rales or wheezing, no pedal edema, no JVD, no hepatosplenomegaly   Abdomen:   gravid, NT   EFW:  3500g   Pelvis:  adequate with gynecoid pelvis   FHT:  140 BPM   Uterine Size: S=D   Presentations: Cephalic   Cervix:     Dilation: 2cm    Effacement: 50%    Station:  -2    Consistency: Soft    Position: Middle     Lab Review  Lab:   Blood type: B     Recent Results (from the past 5880 hour(s))   ABO AND RH DETERMINATION    Collection Time: 07/14/17 12:00 AM   Result Value " Ref Range    Rh Grouping Only POS     ABO Grouping Only B    ANTIBODY SCREEN    Collection Time: 07/14/17 12:00 AM   Result Value Ref Range    Antibody Screen Scrn NEG    PLATELET COUNT    Collection Time: 07/14/17 12:00 AM   Result Value Ref Range    Platelet Count 285  [id number]/uL    RUBELLA ABS IGG    Collection Time: 07/14/17 12:00 AM   Result Value Ref Range    Rubella IgG Antibody IMMUNE    GC DNA PROBE    Collection Time: 07/14/17 12:00 AM   Result Value Ref Range    Gc By Dna Probe NEG    CHLAMYDIA DNA PROBE    Collection Time: 07/14/17 12:00 AM   Result Value Ref Range    Chlamydia By Dna Probe NEG    RPR (SYPHILIS)    Collection Time: 07/14/17 12:00 AM   Result Value Ref Range    Rapid Plasma Reagin -Rpr- NON REACTIVE    HCT    Collection Time: 07/14/17 12:00 AM   Result Value Ref Range    Hematocrit 38.9  %    HGB    Collection Time: 07/14/17 12:00 AM   Result Value Ref Range    Hemoglobin 12.4  g/dL    HIV ANTIBODIES    Collection Time: 07/14/17 12:00 AM   Result Value Ref Range    HIV 1,0,2 IC NEG    HEP B SURFACE ANTIGEN    Collection Time: 07/14/17 12:00 AM   Result Value Ref Range    Hepatitis B Surface Antigen NEG    POCT Urinalysis    Collection Time: 10/17/17  1:30 PM   Result Value Ref Range    POC Color  Negative    POC Appearance  Negative    POC Leukocyte Esterase Trace Negative    POC Nitrites Negative Negative    POC Urobiligen  Negative (0.2) mg/dL    POC Protein Trace Negative mg/dL    POC Urine PH 6.5 5.0 - 8.0    POC Blood Negative Negative    POC Specific Gravity 1.025 <1.005 - >1.030    POC Ketones Negative Negative mg/dL    POC Biliruben  Negative mg/dL    POC Glucose Negative Negative mg/dL   GLUCOSE 1HR GESTATIONAL    Collection Time: 10/26/17 12:23 PM   Result Value Ref Range    Glucose, Post Dose 110 70 - 139 mg/dL   HCT    Collection Time: 10/26/17 12:23 PM   Result Value Ref Range    Hematocrit 34.0 (L) 37.0 - 47.0 %   HGB    Collection Time: 10/26/17 12:23 PM   Result Value Ref  Range    Hemoglobin 11.2 (L) 12.0 - 16.0 g/dL   T.PALLIDUM AB EIA    Collection Time: 10/26/17 12:23 PM   Result Value Ref Range    Syphilis, Treponemal Qual Non Reactive Non Reactive   GRP B STREP, BY PCR ([facility])    Collection Time: 11/15/17 10:11 AM   Result Value Ref Range    Strep Gp B DNA PCR POSITIVE (A) Negative        Assessment:   Celestina Welch at 39w6d  Labor status: Not in labor  Elective induction of labor  Obstetrical history significant for   Patient Active Problem List    Diagnosis Date Noted   • GBS (group B Streptococcus carrier), +RV culture, currently pregnant 2017     Priority: High   • Encounter for supervision of normal pregnancy, antepartum 10/17/2017     Priority: Medium   • Rectus diastasis 10/17/2017     Priority: Medium   • Pregnancy 2017   .      Plan:     Admit to L&D, PNL within normal limits  GBS positive; Ampicillin ppx  IOL with Pitocin as cervix favorable  Anticipate

## 2017-12-19 NOTE — CARE PLAN
Problem: Pain  Goal: Alleviation of Pain or a reduction in pain to the patient's comfort goal  Outcome: PROGRESSING AS EXPECTED  Pt desires epidural when in labor; pt isn't having pain at this time    Problem: Risk for Infection, Impaired Wound Healing  Goal: Remain free from signs and symptoms of infection  Outcome: PROGRESSING AS EXPECTED  Membranes intact; no signs of infection

## 2017-12-20 PROBLEM — Z34.90 PREGNANCY: Status: RESOLVED | Noted: 2017-12-19 | Resolved: 2017-12-20

## 2017-12-20 PROBLEM — Z34.90 ENCOUNTER FOR SUPERVISION OF NORMAL PREGNANCY, ANTEPARTUM: Status: RESOLVED | Noted: 2017-10-17 | Resolved: 2017-12-20

## 2017-12-20 LAB
ERYTHROCYTE [DISTWIDTH] IN BLOOD BY AUTOMATED COUNT: 41 FL (ref 35.9–50)
HCT VFR BLD AUTO: 30.6 % (ref 37–47)
HGB BLD-MCNC: 9.9 G/DL (ref 12–16)
MCH RBC QN AUTO: 26.9 PG (ref 27–33)
MCHC RBC AUTO-ENTMCNC: 32.4 G/DL (ref 33.6–35)
MCV RBC AUTO: 83.2 FL (ref 81.4–97.8)
PLATELET # BLD AUTO: 229 K/UL (ref 164–446)
PMV BLD AUTO: 10.1 FL (ref 9–12.9)
RBC # BLD AUTO: 3.68 M/UL (ref 4.2–5.4)
WBC # BLD AUTO: 15.5 K/UL (ref 4.8–10.8)

## 2017-12-20 PROCEDURE — 770002 HCHG ROOM/CARE - OB PRIVATE (112)

## 2017-12-20 PROCEDURE — A9270 NON-COVERED ITEM OR SERVICE: HCPCS | Performed by: STUDENT IN AN ORGANIZED HEALTH CARE EDUCATION/TRAINING PROGRAM

## 2017-12-20 PROCEDURE — 700102 HCHG RX REV CODE 250 W/ 637 OVERRIDE(OP): Performed by: NURSE PRACTITIONER

## 2017-12-20 PROCEDURE — 85027 COMPLETE CBC AUTOMATED: CPT

## 2017-12-20 PROCEDURE — 700102 HCHG RX REV CODE 250 W/ 637 OVERRIDE(OP): Performed by: STUDENT IN AN ORGANIZED HEALTH CARE EDUCATION/TRAINING PROGRAM

## 2017-12-20 PROCEDURE — A9270 NON-COVERED ITEM OR SERVICE: HCPCS | Performed by: OBSTETRICS & GYNECOLOGY

## 2017-12-20 PROCEDURE — 36415 COLL VENOUS BLD VENIPUNCTURE: CPT

## 2017-12-20 PROCEDURE — 700102 HCHG RX REV CODE 250 W/ 637 OVERRIDE(OP): Performed by: OBSTETRICS & GYNECOLOGY

## 2017-12-20 PROCEDURE — A9270 NON-COVERED ITEM OR SERVICE: HCPCS | Performed by: NURSE PRACTITIONER

## 2017-12-20 RX ORDER — FERROUS SULFATE 325(65) MG
325 TABLET ORAL
Status: DISCONTINUED | OUTPATIENT
Start: 2017-12-20 | End: 2017-12-21 | Stop reason: HOSPADM

## 2017-12-20 RX ORDER — OXYCODONE HYDROCHLORIDE AND ACETAMINOPHEN 5; 325 MG/1; MG/1
1 TABLET ORAL EVERY 4 HOURS PRN
Qty: 15 TAB | Refills: 0 | Status: SHIPPED | OUTPATIENT
Start: 2017-12-20 | End: 2018-01-20

## 2017-12-20 RX ORDER — IBUPROFEN 800 MG/1
800 TABLET ORAL EVERY 8 HOURS PRN
Qty: 30 TAB | Refills: 0 | Status: SHIPPED | OUTPATIENT
Start: 2017-12-20 | End: 2023-03-30

## 2017-12-20 RX ORDER — FERROUS SULFATE 325(65) MG
325 TABLET ORAL
Qty: 30 TAB | Refills: 3 | Status: SHIPPED | OUTPATIENT
Start: 2017-12-20 | End: 2023-03-30

## 2017-12-20 RX ADMIN — ACETAMINOPHEN 325 MG: 325 TABLET, FILM COATED ORAL at 07:19

## 2017-12-20 RX ADMIN — ACETAMINOPHEN 325 MG: 325 TABLET, FILM COATED ORAL at 00:16

## 2017-12-20 RX ADMIN — Medication 1 TABLET: at 07:56

## 2017-12-20 RX ADMIN — IBUPROFEN 600 MG: 600 TABLET, FILM COATED ORAL at 11:34

## 2017-12-20 RX ADMIN — IBUPROFEN 600 MG: 600 TABLET, FILM COATED ORAL at 02:23

## 2017-12-20 RX ADMIN — IBUPROFEN 600 MG: 600 TABLET, FILM COATED ORAL at 19:19

## 2017-12-20 RX ADMIN — ACETAMINOPHEN 325 MG: 325 TABLET, FILM COATED ORAL at 14:56

## 2017-12-20 RX ADMIN — Medication 325 MG: at 07:56

## 2017-12-20 ASSESSMENT — PAIN SCALES - GENERAL
PAINLEVEL_OUTOF10: 2
PAINLEVEL_OUTOF10: 3
PAINLEVEL_OUTOF10: 1
PAINLEVEL_OUTOF10: 3
PAINLEVEL_OUTOF10: 4
PAINLEVEL_OUTOF10: 3
PAINLEVEL_OUTOF10: 3
PAINLEVEL_OUTOF10: 0

## 2017-12-20 NOTE — PROGRESS NOTES
0718- Bedside report received from CHERISE Gil.  Patient denied needs.  0756- Patient assessment done.  Patient stated that she is voiding without difficulty and passing flatus.  Patient denied dizziness and stated that she is walking without difficulty.  Discussed pain management plan and patient prefers to call for pain intervention as needed.  Patient given abdominal binder as previously ordered.  Reviewed plan of care.  4372- Patient shown [facility] Transition to Home video and stated she has no questions at this time.

## 2017-12-20 NOTE — CONSULTS
Breast fed both older children, reports abundant milk supply, did have plugs and mastitis with each prior breastfeeding experience. Discussed prevention and monitoring for plugs and mastitis and recommended exclusive frequent on-demand breastfeeding without pumping for the first 2-3 weeks to establish appropriate milk supply for baby. Parents report breastfeeding is going very well so far and deny need for any assistance with position or latch, they deny any other questions at this time. Lactation will follow if needed.

## 2017-12-20 NOTE — DISCHARGE SUMMARY
Discharge Summary:      Celestina Welch      Admit Date:   2017  Discharge Date:  2017     Admitting diagnosis:  Pregnancy  Pregnancy  Labor and delivery, indication for care  Discharge Diagnosis: Status post vaginal, spontaneous.  Pregnancy Complications: group B strep (treated)  Tubal Ligation:  N\A        History:  Past Medical History:   Diagnosis Date   • Patellar tendonitis    • Rectus diastasis 10/17/2017     OB History    Para Term  AB Living   3 2 2   1 2   SAB TAB Ectopic Molar Multiple Live Births   1       1 2      # Outcome Date GA Lbr Betito/2nd Weight Sex Delivery Anes PTL Lv   3A Term 12 40w0d  3.218 kg (7 lb 1.5 oz) M Vag-Spont EPI N DOTTY      Birth Comments: Pt states no complications   3B Current            2 SAB 09/17/15 10w0d    SAB         Birth Comments: Had D&C   1 Term 14 40w0d  2.977 kg (6 lb 9 oz) F Vag-Spont EPI N DOTTY      Birth Comments: Pt states no complications           Patient has no known allergies.  Patient Active Problem List    Diagnosis Date Noted   • GBS (group B Streptococcus carrier), +RV culture, currently pregnant 2017     Priority: High   • Rectus diastasis 10/17/2017     Priority: Medium   • Postpartum care and examination of lactating mother 2017        Hospital Course:   27 y.o. , now para 3, was admitted with the above mentioned diagnosis, underwent Induction of Labor, vaginal, spontaneous. Patient postpartum course was unremarkable, with progressive advancement in diet , ambulation and toleration of oral analgesia. Patient without complaints today and desires discharge.      Vitals:    17 1914 17 2100 17 0000 17 0400   BP: 127/81 116/70 115/68 (!) 88/59   Pulse: 84 (!) 101 75 89   Resp:  18 16 16   Temp: 36.9 °C (98.5 °F) 37.1 °C (98.7 °F) 36.9 °C (98.4 °F) 36.7 °C (98 °F)   TempSrc:       SpO2:  94% 92% 95%   Weight:       Height:           Current Facility-Administered  Medications   Medication Dose   • ferrous sulfate tablet 325 mg  325 mg   • ondansetron (ZOFRAN ODT) dispertab 4 mg  4 mg    Or   • ondansetron (ZOFRAN) syringe/vial injection 4 mg  4 mg   • oxytocin (PITOCIN) infusion (for postpartum)  2,000 mL/hr    Followed by   • oxytocin (PITOCIN) infusion (for postpartum)   mL/hr   • ibuprofen (MOTRIN) tablet 600 mg  600 mg   • acetaminophen (TYLENOL) tablet 325 mg  325 mg   • oxycodone-acetaminophen (PERCOCET) 5-325 MG per tablet 1 Tab  1 Tab   • hydrocodone/acetaminophen (NORCO)  MG per tablet 1 Tab  1 Tab   • lactated ringers infusion     • calcium carbonate (TUMS) chewable tab 1,000 mg  1,000 mg   • LR infusion     • PRN oxytocin (PITOCIN) (20 Units/1000 mL) PRN for excessive uterine bleeding - See Admin Instr  125-999 mL/hr   • misoprostol (CYTOTEC) tablet 600 mcg  600 mcg   • methylergonovine (METHERGINE) injection 0.2 mg  0.2 mg   • carboPROST (HEMABATE) injection 250 mcg  250 mcg   • docusate sodium (COLACE) capsule 100 mg  100 mg   • prenatal plus vitamin (STUARTNATAL 1+1) 27-1 MG tablet 1 Tab  1 Tab       Exam:  Breast Exam: negative  Abdomen: Abdomen soft, non-tender. BS normal. No masses,  No organomegaly  Fundus Non Tender: yes  Incision: none  Perineum: perineum intact  Extremity: extremities, peripheral pulses and reflexes normal, no edema, redness or tenderness in the calves or thighs, feet normal, good pulses, normal color, temperature and sensation     Labs:  Recent Labs      12/19/17   1030  12/20/17   0506   WBC  10.4  15.5*   RBC  4.31  3.68*   HEMOGLOBIN  11.9*  9.9*   HEMATOCRIT  36.3*  30.6*   MCV  84.2  83.2   MCH  27.6  26.9*   MCHC  32.8*  32.4*   RDW  41.0  41.0   PLATELETCT  285  229   MPV  10.2  10.1        Activity:   Discharge to home  Pelvic Rest x 6 weeks    Assessment:  normal postpartum course  Discharge Assessment: Taking adequate diet and fluids, no heavy bleeding or foul discharge     Follow up: .[facility] in 5 weeks for  vaginal     Discharge Meds:   Current Outpatient Prescriptions   Medication Sig Dispense Refill   • oxycodone-acetaminophen (PERCOCET) 5-325 MG Tab Take 1 Tab by mouth every four hours as needed (for Moderate Pain (Pain Scale 4-6) after delivery) for up to 15 doses. 15 Tab 0   • ibuprofen (MOTRIN) 800 MG Tab Take 1 Tab by mouth every 8 hours as needed (For cramping after delivery; do not give if patient is receiving ketorolac (Toradol)). 30 Tab 0   • ferrous sulfate 325 (65 Fe) MG tablet Take 1 Tab by mouth every morning with breakfast. 30 Tab 3     GBS positive- adequate treatment during labor  Desires discharge- no birth control needed  Pt need to RT [facility] or ER if any of the following occur:  Fever over 100.5  Severe abd pain  Red streaks or painful masses in the breasts  Foul smelling d/c or lochia  Heavy vaginal bleeding saturating a pad per hour  S/s of PP depression    KAYLEIGH LeeN.M.

## 2017-12-20 NOTE — DELIVERY NOTE
Vaginal Delivery Note    Celestina Welch is a  2, now para 3, who presented not in labor at 39w6d.  Patient progressed in active labor with pitocin augmentation/induction to cervical exam 100%; cervical dilation 10; station 0 to complete the first stage of labor.  Patient then progressed through second stage and delivered spontaneously a viable female infant over an intact perineum.  Nuchal cord present.  Infant Apgar 8 and 9, and weight pending.    During the third stage the placenta was delivered spontaneously and was intact with 3 vessels    Assisted extraction:  none    Perineum repair:  none    Analgesia: none    Epidural:  yes    Family support:  yes    Infant bonding:  excellent    Estimated blood loss:  200 mL    Sponge count correct:  yes    Patient tolerated the procedure:  excellent

## 2017-12-20 NOTE — PROGRESS NOTES
1029- Dr. Goldstein notified that the patient desires to stay until tomorrow due to her infant not being discharged secondary to patient's GBBS positive status.  Telephone order received to cancel discharge.

## 2017-12-20 NOTE — PROGRESS NOTES
Post Partum Progress Note    Name:   Celestina Welch   Date/Time:  12/20/2017 - 6:25 AM  Chief Admitting Dx:  Pregnancy  Pregnancy  Labor and delivery, indication for care  Delivery Type:  vaginal, spontaneous  Post-Op/Post Partum Days #:  1    Subjective:  Abdominal pain: no  Ambulating:   yes  Tolerating liquids:  yes  Tolerating food:  yes common adult  Flatus:   no  BM:    no  Bleeding:   without any bleeding  Voiding:   yes  Dizziness:   no  Feeding:   breast    Vitals:    12/19/17 1914 12/19/17 2100 12/20/17 0000 12/20/17 0400   BP: 127/81 116/70 115/68 (!) 88/59   Pulse: 84 (!) 101 75 89   Resp:  18 16 16   Temp: 36.9 °C (98.5 °F) 37.1 °C (98.7 °F) 36.9 °C (98.4 °F) 36.7 °C (98 °F)   TempSrc:       SpO2:  94% 92% 95%   Weight:       Height:           Exam:  Breast: Tenderness no, Engorged no and Lactating no  Abdomen: Abdomen soft, non-tender. BS normal. No masses,  No organomegaly  Fundal Tenderness:  no  Fundus Firm: yes  Incision: none  Below umbilicus: N\A  Perineum: perineum intact  Lochia: mild  Extremities: Normal, trace extremities, peripheral pulses and reflexes normal, no edema, redness or tenderness in the calves or thighs, feet normal, good pulses, normal color, temperature and sensation    Meds:  Current Facility-Administered Medications   Medication Dose   • ondansetron (ZOFRAN ODT) dispertab 4 mg  4 mg    Or   • ondansetron (ZOFRAN) syringe/vial injection 4 mg  4 mg   • oxytocin (PITOCIN) infusion (for postpartum)  2,000 mL/hr    Followed by   • oxytocin (PITOCIN) infusion (for postpartum)   mL/hr   • ibuprofen (MOTRIN) tablet 600 mg  600 mg   • acetaminophen (TYLENOL) tablet 325 mg  325 mg   • oxycodone-acetaminophen (PERCOCET) 5-325 MG per tablet 1 Tab  1 Tab   • hydrocodone/acetaminophen (NORCO)  MG per tablet 1 Tab  1 Tab   • lactated ringers infusion     • calcium carbonate (TUMS) chewable tab 1,000 mg  1,000 mg   • LR infusion     • PRN oxytocin (PITOCIN) (20  Units/1000 mL) PRN for excessive uterine bleeding - See Admin Instr  125-999 mL/hr   • misoprostol (CYTOTEC) tablet 600 mcg  600 mcg   • methylergonovine (METHERGINE) injection 0.2 mg  0.2 mg   • carboPROST (HEMABATE) injection 250 mcg  250 mcg   • docusate sodium (COLACE) capsule 100 mg  100 mg   • prenatal plus vitamin (STUARTNATAL 1+1) 27-1 MG tablet 1 Tab  1 Tab       Labs:   Recent Labs      12/19/17   1030  12/20/17   0506   WBC  10.4  15.5*   RBC  4.31  3.68*   HEMOGLOBIN  11.9*  9.9*   HEMATOCRIT  36.3*  30.6*   MCV  84.2  83.2   MCH  27.6  26.9*   MCHC  32.8*  32.4*   RDW  41.0  41.0   PLATELETCT  285  229   MPV  10.2  10.1       Assessment:  Chief Admitting Dx:  Pregnancy  Pregnancy  Labor and delivery, indication for care  Delivery Type:  vaginal, spontaneous  Tubal Ligation:  N\A    Plan:  Continue routine post partum care.  GBS positive  Asymptomatic anemia- start iron PO daily  Anticipate discharge PPD#2    Barb Moulton C.N.M.

## 2017-12-20 NOTE — PROGRESS NOTES
Pt arrived to [facility] via wheelchair with L&D RN. Report received from Zeinab. Assessment completed. Denies pain at this time. Pt oriented to room, call light, infant security, emergency light, and unit routine. Encouraged to call for assistance.

## 2017-12-21 VITALS
HEART RATE: 89 BPM | RESPIRATION RATE: 18 BRPM | BODY MASS INDEX: 26.58 KG/M2 | SYSTOLIC BLOOD PRESSURE: 108 MMHG | WEIGHT: 150 LBS | DIASTOLIC BLOOD PRESSURE: 73 MMHG | HEIGHT: 63 IN | TEMPERATURE: 98.5 F | OXYGEN SATURATION: 98 %

## 2017-12-21 PROCEDURE — 700102 HCHG RX REV CODE 250 W/ 637 OVERRIDE(OP): Performed by: STUDENT IN AN ORGANIZED HEALTH CARE EDUCATION/TRAINING PROGRAM

## 2017-12-21 PROCEDURE — A9270 NON-COVERED ITEM OR SERVICE: HCPCS | Performed by: OBSTETRICS & GYNECOLOGY

## 2017-12-21 PROCEDURE — 700102 HCHG RX REV CODE 250 W/ 637 OVERRIDE(OP): Performed by: OBSTETRICS & GYNECOLOGY

## 2017-12-21 PROCEDURE — A9270 NON-COVERED ITEM OR SERVICE: HCPCS | Performed by: STUDENT IN AN ORGANIZED HEALTH CARE EDUCATION/TRAINING PROGRAM

## 2017-12-21 PROCEDURE — 700102 HCHG RX REV CODE 250 W/ 637 OVERRIDE(OP): Performed by: NURSE PRACTITIONER

## 2017-12-21 PROCEDURE — A9270 NON-COVERED ITEM OR SERVICE: HCPCS | Performed by: NURSE PRACTITIONER

## 2017-12-21 RX ADMIN — ACETAMINOPHEN 325 MG: 325 TABLET, FILM COATED ORAL at 00:24

## 2017-12-21 RX ADMIN — Medication 1 TABLET: at 07:59

## 2017-12-21 RX ADMIN — IBUPROFEN 600 MG: 600 TABLET, FILM COATED ORAL at 08:09

## 2017-12-21 RX ADMIN — Medication 325 MG: at 07:59

## 2017-12-21 ASSESSMENT — LIFESTYLE VARIABLES: EVER_SMOKED: NEVER

## 2017-12-21 ASSESSMENT — PAIN SCALES - GENERAL
PAINLEVEL_OUTOF10: 0
PAINLEVEL_OUTOF10: 2
PAINLEVEL_OUTOF10: 0
PAINLEVEL_OUTOF10: 3

## 2017-12-21 NOTE — PROGRESS NOTES
1755- Discharge education sheet reviewed with patient who verbalized understanding and stated she has no questions.

## 2017-12-21 NOTE — PROGRESS NOTES
Mother reports BF is going well, denies pain with BF, no nipple/breast damage noted at this point, discussed history of mastitis and breast plugs and methods to monitor for and prevent the same, encouraged to avoid pumping for 2-3 weeks if baby BF well/emptying breasts appropriately to avoid overstimulation, discussed outpatient resources available and encouraged to seek assistance as needed.

## 2017-12-21 NOTE — DISCHARGE SUMMARY
Discharge Summary:      Celestina Welch      Admit Date:   2017  Discharge Date:  2017     Admitting diagnosis:  Pregnancy  Pregnancy  Labor and delivery, indication for care  Discharge Diagnosis: Status post vaginal, spontaneous.  Pregnancy Complications: GBS+ adequately treated, elective IOL  Tubal Ligation:  no        History:  Past Medical History:   Diagnosis Date   • Patellar tendonitis    • Rectus diastasis 10/17/2017     OB History    Para Term  AB Living   3 2 2   1 2   SAB TAB Ectopic Molar Multiple Live Births   1       1 2      # Outcome Date GA Lbr Betito/2nd Weight Sex Delivery Anes PTL Lv   3A Term 12 40w0d  3.218 kg (7 lb 1.5 oz) M Vag-Spont EPI N DOTTY      Birth Comments: Pt states no complications   3B Current            2 SAB 09/17/15 10w0d    SAB         Birth Comments: Had D&C   1 Term 14 40w0d  2.977 kg (6 lb 9 oz) F Vag-Spont EPI N DOTTY      Birth Comments: Pt states no complications           Patient has no known allergies.  Patient Active Problem List    Diagnosis Date Noted   • GBS (group B Streptococcus carrier), +RV culture, currently pregnant 2017     Priority: High   • Rectus diastasis 10/17/2017     Priority: Medium   • Postpartum care and examination of lactating mother 2017        Hospital Course:   27 y.o. , now para 2, was admitted with the above mentioned diagnosis, underwent Induction of Labor, vaginal, spontaneous. Patient postpartum course was unremarkable, with progressive advancement in diet , ambulation and toleration of oral analgesia. Patient without complaints today and desires discharge.      Vitals:    17 0000 17 0400 17 0800 17   BP: 115/68 (!) 88/59 104/69 106/75   Pulse: 75 89 91 70   Resp: 16 16 17 18   Temp: 36.9 °C (98.4 °F) 36.7 °C (98 °F) 36.7 °C (98 °F) 36.7 °C (98.1 °F)   TempSrc:       SpO2: 92% 95% 98% 100%   Weight:       Height:           Current Facility-Administered  Medications   Medication Dose   • ferrous sulfate tablet 325 mg  325 mg   • ondansetron (ZOFRAN ODT) dispertab 4 mg  4 mg    Or   • ondansetron (ZOFRAN) syringe/vial injection 4 mg  4 mg   • oxytocin (PITOCIN) infusion (for postpartum)   mL/hr   • ibuprofen (MOTRIN) tablet 600 mg  600 mg   • acetaminophen (TYLENOL) tablet 325 mg  325 mg   • oxycodone-acetaminophen (PERCOCET) 5-325 MG per tablet 1 Tab  1 Tab   • hydrocodone/acetaminophen (NORCO)  MG per tablet 1 Tab  1 Tab   • lactated ringers infusion     • calcium carbonate (TUMS) chewable tab 1,000 mg  1,000 mg   • LR infusion     • PRN oxytocin (PITOCIN) (20 Units/1000 mL) PRN for excessive uterine bleeding - See Admin Instr  125-999 mL/hr   • misoprostol (CYTOTEC) tablet 600 mcg  600 mcg   • methylergonovine (METHERGINE) injection 0.2 mg  0.2 mg   • carboPROST (HEMABATE) injection 250 mcg  250 mcg   • docusate sodium (COLACE) capsule 100 mg  100 mg   • prenatal plus vitamin (STUARTNATAL 1+1) 27-1 MG tablet 1 Tab  1 Tab       Exam:  CVS: RRR, no murmurs  Lungs: CTAB  Breast Exam: negative  Abdomen: Abdomen soft, non-tender. BS normal. No masses,  No organomegaly  Fundus Non Tender: yes  Perineum: perineum intact  Extremity: extremities, peripheral pulses and reflexes normal, no edema, redness or tenderness in the calves or thighs     Labs:  Recent Labs      12/19/17   1030  12/20/17   0506   WBC  10.4  15.5*   RBC  4.31  3.68*   HEMOGLOBIN  11.9*  9.9*   HEMATOCRIT  36.3*  30.6*   MCV  84.2  83.2   MCH  27.6  26.9*   MCHC  32.8*  32.4*   RDW  41.0  41.0   PLATELETCT  285  229   MPV  10.2  10.1        Activity:   Discharge to home  Pelvic Rest x 6 weeks    Assessment:  normal postpartum course  Discharge Assessment: Taking adequate diet and fluids, No heavy bleeding or foul vaginal discharge , Voiding without difficulty     Follow up: .[facility] in 5 weeks for vaginal.   To resume daily PNV and iron supplement if needed with hydration.   Patient to RT [facility] or  ER if any of the following occur:  Fever over 100.5  Severe abdominal pain  Red streaks or painful masses in the breasts  Foul smelling discharge or lochia  Heavy vaginal bleeding saturating a pad per hour  S/s of PP depression     Discharge Meds:   Current Outpatient Prescriptions   Medication Sig Dispense Refill   • oxycodone-acetaminophen (PERCOCET) 5-325 MG Tab Take 1 Tab by mouth every four hours as needed (for Moderate Pain (Pain Scale 4-6) after delivery) for up to 15 doses. 15 Tab 0   • ibuprofen (MOTRIN) 800 MG Tab Take 1 Tab by mouth every 8 hours as needed (For cramping after delivery; do not give if patient is receiving ketorolac (Toradol)). 30 Tab 0   • ferrous sulfate 325 (65 Fe) MG tablet Take 1 Tab by mouth every morning with breakfast. 30 Tab 3       Jelly Goldstein M.D.

## 2017-12-21 NOTE — PROGRESS NOTES
1035- Patient stated that she is ready for discharge.  Patient stated she can ambulate out.  Patient discharged to home, no change noted in condition, with infant and FOB.

## 2017-12-21 NOTE — PROGRESS NOTES
Patient sleeping in bed with infant. Woke patient up and informed about dangers of co sleeping and  placed infant in crib.

## 2017-12-21 NOTE — CARE PLAN
Problem: Altered physiologic condition related to immediate post-delivery state and potential for bleeding/hemorrhage  Goal: Patient physiologically stable as evidenced by normal lochia, palpable uterine involution and vital signs within normal limits  Outcome: PROGRESSING AS EXPECTED  Infant maintaining temperature within normal limits in open crib.    Problem: Potential knowledge deficit related to lack of understanding of self and  care  Goal: Patient will verbalize understanding of self and infant care  Outcome: PROGRESSING AS EXPECTED  Verbalizes acceptable pain relief with pain medication being given as requested.  Goal: Patient will demonstrate ability to care for self and infant  Outcome: PROGRESSING AS EXPECTED  Bonding well with infant and breast feeding independently.

## 2017-12-21 NOTE — DISCHARGE INSTRUCTIONS
POSTPARTUM DISCHARGE INSTRUCTIONS FOR MOM    YOB: 1990   Age: 27 y.o.               Admit Date: 12/19/2017     Discharge Date: 12/21/2017  Attending Doctor:  Nathalia Diamond M.D.                  Allergies:  Patient has no known allergies.    Discharged to home by car. Discharged via wheelchair, hospital escort: Yes.  Special equipment needed: Not Applicable  Belongings with: Personal  Be sure to schedule a follow-up appointment with your primary care doctor or any specialists as instructed.     Discharge Plan:   Diet Plan: Discussed  Activity Level: Discussed  Confirmed Follow up Appointment: Patient to Call and Schedule Appointment  Confirmed Symptoms Management: Discussed  Medication Reconciliation Updated: Yes  Influenza Vaccine Indication: Patient Refuses    REASONS TO CALL YOUR OBSTETRICIAN:  1.   Persistent fever or shaking chills (Temperature higher than 100.4)  2.   Heavy bleeding (soaking more than 1 pad per hour); Passing clots  3.   Foul odor from vagina  4.   Mastitis (Breast infection; breast pain, chills, fever, redness)  5.   Urinary pain, burning or frequency  6.   Severe depression longer than 24 hours    HAND WASHING  · Prior to handling the baby.  · Before breastfeeding or bottle feeding baby.  · After using the bathroom or changing the baby's diaper.    VAGINAL CARE  · Nothing inside vagina for 6 weeks: no sexual intercourse, tampons or douching.  · Bleeding may continue for 2-4 weeks.  Amount may vary.    · Call your physician for heavy bleeding which means soaking more than 1 pad per hour    BIRTH CONTROL  · It is possible to become pregnant at any time after delivery and while breastfeeding.  · Plan to discuss a method of birth control with your physician at your follow up visit. visit.    DIET AND ELIMINATION  · Eating more fiber (bran cereal, fruits, and vegetables) and drinking plenty of fluids will help to avoid constipation.  · Urinary frequency after childbirth is  "normal.    POSTPARTUM BLUES  During the first few days after birth, you may experience a sense of the \"blues\" which may include impatience, irritability or even crying.  These feeling come and go quickly.  However, as many as 1 in 10 women experience emotional symptoms known as postpartum depression.    Postpartum depression:  May start as early as the second or third day after delivery or take several weeks or months to develop.  Symptoms of \"blues\" are present, but are more intense:  Crying spells; loss of appetite; feelings of hopelessness or loss of control; fear of touching the baby; over concern or no concern at all about the baby; little or no concern about your own appearance/caring for yourself; and/or inability to sleep or excessive sleeping.  Contact your physician if you are experiencing any of these symptoms.    Crisis Hotline:  · [place] Crisis Hotline:  5-881-XWRJKNB  Or 1-243.473.5372  · [place] Crisis Hotline:  1-595.837.5824  Or 142-732-3633    PREVENTING SHAKEN BABY:  If you are angry or stressed, PUT THE BABY IN THE CRIB, step away, take some deep breaths, and wait until you are calm to care for the baby.  DO NOT SHAKE THE BABY.  You are not alone, call a supporter for help.  · Crisis Call Center 24/7 crisis line 030-004-5640 or 1-374.755.1273  · You can also text them, text \"ANSWER\" to 833218    QUIT SMOKING/TOBACCO USE:  I understand the use of any tobacco products increases my chance of suffering from future heart disease and could cause other illnesses which may shorten my life. Quitting the use of tobacco products is the single most important thing I can do to improve my health. For further information on smoking / tobacco cessation call a Toll Free Quit Line at 1-929.578.7731 (*[employer] [place]) or 1-151.560.3627 (American Lung Association) or you can access the web based program at www.lungusa.org.  · [place] Tobacco Users Help Line:  (641) 350-1521       Toll Free: " 4-088-482-5604  · Quit Tobacco Program [facility] (511)246-6226    DEPRESSION / SUICIDE RISK:  As you are discharged from this [facility], it is important to learn how to keep safe from harming yourself.    Recognize the warning signs:  · Abrupt changes in personality, positive or negative- including increase in energy   · Giving away possessions  · Change in eating patterns- significant weight changes-  positive or negative  · Change in sleeping patterns- unable to sleep or sleeping all the time   · Unwillingness or inability to communicate  · Depression  · Unusual sadness, discouragement and loneliness  · Talk of wanting to die  · Neglect of personal appearance   · Rebelliousness- reckless behavior  · Withdrawal from people/activities they love  · Confusion- inability to concentrate     If you or a loved one observes any of these behaviors or has concerns about self-harm, here's what you can do:  · Talk about it- your feelings and reasons for harming yourself  · Remove any means that you might use to hurt yourself (examples: pills, rope, extension cords, firearm)  · Get professional help from the community (Mental Health, Substance Abuse, psychological counseling)  · Do not be alone:Call your Safe Contact- someone whom you trust who will be there for you.  · Call your local CRISIS HOTLINE 042-4704 or 812-736-2618  · Call your local Children's Mobile Crisis Response Team [place] (701) 329-7916 or www.[employer] (ROOF)  · Call the toll free National Suicide Prevention Hotlines   · National Suicide Prevention Lifeline 353-121-TVYC (2065)  · National Hope Line Network 800-SUICIDE (300-9207)    DISCHARGE SURVEY:  Thank you for choosing [facility].  We hope we provided you with very good care.  You may be receiving a survey.  Your opinion is valuable to us.    ADDITIONAL EDUCATIONAL MATERIALS GIVEN TO PATIENT: none    My signature on this form indicates that:  1.  I have  reviewed and understand the above information  2.  My questions regarding this information have been answered to my satisfaction.  3.  I have formulated a plan with my discharge nurse to obtain my prescribed medication for home.

## 2017-12-21 NOTE — PROGRESS NOTES
0703- Bedside report received from CHERISE Almonte.  Patient denied needs.  0759- Patient assessment done.  Patient stated that she is voiding without difficulty and passing flatus.  Patient denied dizziness and stated that she is walking without difficulty.  Discussed pain management plan and patient prefers to call for pain intervention as needed.  Reviewed plan of care.  FOB at bedside.

## 2018-02-07 ENCOUNTER — POST PARTUM (OUTPATIENT)
Dept: OBGYN | Facility: CLINIC | Age: 28
End: 2018-02-07
Payer: MEDICAID

## 2018-02-07 VITALS — BODY MASS INDEX: 23.21 KG/M2 | WEIGHT: 131 LBS | DIASTOLIC BLOOD PRESSURE: 62 MMHG | SYSTOLIC BLOOD PRESSURE: 100 MMHG

## 2018-02-07 PROBLEM — O99.820 GBS (GROUP B STREPTOCOCCUS CARRIER), +RV CULTURE, CURRENTLY PREGNANT: Status: RESOLVED | Noted: 2017-11-17 | Resolved: 2018-02-07

## 2018-02-07 PROCEDURE — 90050 PR POSTPARTUM VISIT: CPT | Performed by: NURSE PRACTITIONER

## 2018-02-07 ASSESSMENT — ENCOUNTER SYMPTOMS
MUSCULOSKELETAL NEGATIVE: 1
GASTROINTESTINAL NEGATIVE: 1
CONSTITUTIONAL NEGATIVE: 1
PSYCHIATRIC NEGATIVE: 1
EYES NEGATIVE: 1
RESPIRATORY NEGATIVE: 1
CARDIOVASCULAR NEGATIVE: 1
NEUROLOGICAL NEGATIVE: 1

## 2018-02-07 NOTE — PROGRESS NOTES
Pt here today for postpartum exam, pt delivered on 12/19/17  Currently breast feeding.   BCM: Pt states her  got vasectomy.   Good ph: [phone]  Pt states having pain on her back after epidural.

## 2018-02-08 NOTE — PROGRESS NOTES
Subjective:    Celestina Welch is a 27 y.o. female who presents for her postpartum exam 7 weeks following . Her prenatal course was complicated by pain and discomfort related to rectus diastasis. She denies dysuria, vaginal bleeding, odor, itching or breast problems. She is breastfeeding without difficulty. She doesn't need birth control at this time as her  had a vasectomy. May want hormones for menstruation regulation after she is done breastfeeding. Reports sex prior to this appointment but denies any difficulty or issues. Eating a regular diet without difficulty. Bowel movement are Normal.  She is having back pain pinpointed to where her epidural was placed. Will follow up with PCP for further eval if not getting better in 4-5 weeks. Also desires surgical intervention for rectus diastasis, and informed that she should also see PCP for referral and evaluation of need for surgical intervention. Spotting. Patient denies any s/sx of postpartum depression.     Problem List     Patient Active Problem List    Diagnosis Date Noted   • GBS (group B Streptococcus carrier), +RV culture, currently pregnant 2017     Priority: High   • Rectus diastasis 10/17/2017     Priority: Medium   • Postpartum care and examination of lactating mother 2017       Objective    See PE  Lab: H&H at d/c: 9.9/30.6  /62   Wt 59.4 kg (131 lb)   LMP 2017   BMI 23.21 kg/m²     Assessment:    1. PP care of lactating women   2. Exam WNL   3. Pap WNL early  per patient, no records.       Plan:    1. Breastfeeding support   2. Continue PNV   3. Contraceptive counseling - follow up w health dept,  [facility], or [facility] for women's health needs, contraceptive/hormonal needs, or future pregnancies  4. Encouraged condom use for STI protection  5. Discussed diet, exercise and resumption of sexual activity   6. Preconception guidance for next pregnancy if applicable. Discussed pregnancy spacing risk  factors. Folic acid for all women of childbearing age.      HPI    Review of Systems   Constitutional: Negative.    HENT: Negative.    Eyes: Negative.    Respiratory: Negative.    Cardiovascular: Negative.    Gastrointestinal: Negative.    Genitourinary: Negative.    Musculoskeletal: Negative.    Skin: Negative.    Neurological: Negative.    Endo/Heme/Allergies: Negative.    Psychiatric/Behavioral: Negative.    All other systems reviewed and are negative.         Objective:     /62   Wt 59.4 kg (131 lb)   LMP 2017   BMI 23.21 kg/m²      Physical Exam   Constitutional: She is oriented to person, place, and time. She appears well-developed and well-nourished.   HENT:   Nose: Nose normal.   Eyes: Conjunctivae are normal.   Neck: Normal range of motion.   Cardiovascular: Normal rate, regular rhythm, normal heart sounds and intact distal pulses.    Pulmonary/Chest: Effort normal and breath sounds normal.   Abdominal: Soft.   Genitourinary: Vagina normal and uterus normal.   Musculoskeletal: Normal range of motion.   Neurological: She is alert and oriented to person, place, and time.   Skin: Skin is warm and dry. Capillary refill takes less than 2 seconds.   Psychiatric: She has a normal mood and affect. Her behavior is normal. Judgment and thought content normal.   Nursing note and vitals reviewed.       Assessment/Plan:     1. Postpartum care and examination of lactating woman   17- no problems

## 2018-03-01 ENCOUNTER — OFFICE VISIT (OUTPATIENT)
Dept: MEDICAL GROUP | Facility: MEDICAL CENTER | Age: 28
End: 2018-03-01
Attending: FAMILY MEDICINE
Payer: MEDICAID

## 2018-03-01 VITALS
WEIGHT: 133 LBS | HEIGHT: 63 IN | HEART RATE: 88 BPM | SYSTOLIC BLOOD PRESSURE: 98 MMHG | BODY MASS INDEX: 23.57 KG/M2 | DIASTOLIC BLOOD PRESSURE: 64 MMHG | RESPIRATION RATE: 16 BRPM | OXYGEN SATURATION: 98 % | TEMPERATURE: 98.2 F

## 2018-03-01 DIAGNOSIS — M54.50 ACUTE MIDLINE LOW BACK PAIN WITHOUT SCIATICA: ICD-10-CM

## 2018-03-01 DIAGNOSIS — M62.08 RECTUS DIASTASIS: ICD-10-CM

## 2018-03-01 PROCEDURE — 99213 OFFICE O/P EST LOW 20 MIN: CPT | Performed by: FAMILY MEDICINE

## 2018-03-01 PROCEDURE — 99204 OFFICE O/P NEW MOD 45 MIN: CPT | Performed by: FAMILY MEDICINE

## 2018-03-01 RX ORDER — PREDNISONE 5 MG/1
TABLET ORAL
Qty: 20 TAB | Refills: 0 | Status: SHIPPED | OUTPATIENT
Start: 2018-03-01 | End: 2023-03-30

## 2018-03-01 ASSESSMENT — PAIN SCALES - GENERAL: PAINLEVEL: 4=SLIGHT-MODERATE PAIN

## 2018-03-01 NOTE — ASSESSMENT & PLAN NOTE
"Patient received a technically difficult (3 attempts) epidural injection on 12/19/17 while in labor. She reports at the immediate site where the injection was given has been sore since that date. Pain does not radiate to either buttock or lower extremity. She is not reporting any focal numbness. She has been seeing a chiropractor for several visits but he reports that she is \"tight\" in the affected area and he is not really able to mobilize her spinal elements in that region.  "

## 2018-03-01 NOTE — PROGRESS NOTES
"Chief Complaint   Patient presents with   • Establish Care   • Abdominal Pain   • Back Pain         HISTORY OF THE PRESENT ILLNESS: Patient is a 27 y.o. female. This pleasant patient is here today to establish care and follow-up on rectus diastases, persistent low back pain at site of previous epidural injection.      Rectus diastasis  Patient reports onset of severe sharp upper midline epigastric area pain beginning in the last several months of pregnancy and continuing through to the present. She reports this does not have a burning quality similar to past episodes of heartburn and does not radiate up into her upper chest. She has been diagnosed clinically with rectus abdominis diastases. Patient is not considering any surgical repair. She is breast-feeding and does not want that interrupted. She reports swallowing without difficulty. Normal stools without diarrhea, constipation, black or bloody stools.    Acute midline low back pain without sciatica  Patient received a technically difficult (3 attempts) epidural injection on 12/19/17 while in labor. She reports at the immediate site where the injection was given has been sore since that date. Pain does not radiate to either buttock or lower extremity. She is not reporting any focal numbness. She has been seeing a chiropractor for several visits but he reports that she is \"tight\" in the affected area and he is not really able to mobilize her spinal elements in that region.   Social history-, homemaker    Allergies: Patient has no known allergies.    Current Outpatient Prescriptions Ordered in [facility]   Medication Sig Dispense Refill   • predniSONE (DELTASONE) 5 MG Tab For by mouth daily ×2 days, 3 by mouth daily ×2 days, 2 by mouth daily ×2 days, 1 by mouth daily ×2 days 20 Tab 0   • ibuprofen (MOTRIN) 800 MG Tab Take 1 Tab by mouth every 8 hours as needed (For cramping after delivery; do not give if patient is receiving ketorolac (Toradol)). 30 Tab 0   • " ferrous sulfate 325 (65 Fe) MG tablet Take 1 Tab by mouth every morning with breakfast. 30 Tab 3   • docusate sodium (COLACE) 100 MG CAPS Take 1 Cap by mouth 2 times a day as needed for Constipation. 60 Cap 1   • prenatal vitamin with Fe/FA (ENMANUEL PRENATAL) 28-0.8 MG TABS Take 1 Tab by mouth every morning. 30 Each prn     No current Epic-ordered facility-administered medications on file.        Past Medical History:   Diagnosis Date   • Patellar tendonitis    • Rectus diastasis 10/17/2017       History reviewed. No pertinent surgical history.    Social History   Substance Use Topics   • Smoking status: Never Smoker   • Smokeless tobacco: Never Used      Comment: occassional    • Alcohol use Yes      Comment: occassional        Family Status   Relation Status   • Mother Alive   • Father Alive   • Sister Alive    high cholesterol   • Brother Alive   • Maternal Grandmother    • Paternal Grandfather    • Neg Hx      Family History   Problem Relation Age of Onset   • Diabetes Mother    • Other Father    • Cancer Maternal Grandmother      breast   • Cancer Paternal Grandfather    • Heart Disease Neg Hx    • Stroke Neg Hx        Review of Systems   Constitutional: Negative for fever, chills, weight loss and malaise/fatigue.   HENT: Negative for ear pain, nosebleeds, congestion, sore throat and neck pain.    Eyes: Negative for blurred vision.   Respiratory: Negative for cough, sputum production, shortness of breath and wheezing.    Cardiovascular: Negative for chest pain, palpitations, orthopnea and leg swelling.   Gastrointestinal: Negative for heartburn, nausea, vomiting   Genitourinary: Negative for dysuria, urgency and frequency.   Musculoskeletal: Negative for myalgias, back pain and joint pain.   Skin: Negative for rash and itching.   Neurological: Negative for dizziness, tingling, tremors, sensory change, focal weakness and headaches.   Endo/Heme/Allergies: Does not bruise/bleed easily.   Psychiatric/Behavioral:  "Negative for depression, anxiety, or memory loss.             Exam: Blood pressure (!) 98/64, pulse 88, temperature 36.8 °C (98.2 °F), resp. rate 16, height 1.6 m (5' 3\"), weight 60.3 kg (133 lb), last menstrual period 02/07/2017, SpO2 98 %, not currently breastfeeding.  General: Normal appearing. No distress.  HEENT: Normocephalic. Eyes conjunctiva clear lids without ptosis, pupils equal and reactive to light accommodation, ears normal shape and contour, canals are clear bilaterally, tympanic membranes are benign, nasal mucosa benign, oropharynx is without erythema, edema or exudates.   Neck: Supple without JVD or bruit. Thyroid is not enlarged.  Pulmonary: Clear to ausculation.  Normal effort. No rales, ronchi, or wheezing.  Cardiovascular: Regular rate and rhythm without murmur. Carotid and radial pulses are intact and equal bilaterally.  Abdomen: Soft,  nondistended. Normal bowel sounds. Liver and spleen are not palpable. 1+ tenderness with guarding in the upper epigastric area midline  Neurologic: Intact light touch and strength bilaterally,normal speech, no tremor, normal gait.  Back-focal tenderness to palpation over the midline at approximately L2 level. No swelling, bruising   Lymph: No cervical, supraclavicular or axillary lymph nodes are palpable  Skin: Warm and dry.  No obvious lesions.  Musculoskeletal: Normal gait. No extremity cyanosis, clubbing, or edema.  Psych: Normal mood and affect. Alert and oriented x3. Judgment and insight is normal.    Please note that this dictation was created using voice recognition software. I have made every reasonable attempt to correct obvious errors, but I expect that there are errors of grammar and possibly content that I did not discover before finalizing the note.      Assessment/Plan  1. Rectus diastasis     2. Acute midline low back pain without sciatica       Plan: 1. Rx prednisone 5 mg, 4 by mouth daily ×2 days with a 8 day taper-medication " effects/breast-feeding reviewed  2. Recommend observation at this time regarding abdominal pain, patient is not interested in discussion of surgical repair  3. Follow-up with me one month  4. Abdominal ultrasound to evaluate extent of diastases

## 2018-03-01 NOTE — ASSESSMENT & PLAN NOTE
Patient reports onset of severe sharp upper midline epigastric area pain beginning in the last several months of pregnancy and continuing through to the present. She reports this does not have a burning quality similar to past episodes of heartburn and does not radiate up into her upper chest. She has been diagnosed clinically with rectus abdominis diastases. Patient is not considering any surgical repair. She is breast-feeding and does not want that interrupted. She reports swallowing without difficulty. Normal stools without diarrhea, constipation, black or bloody stools.

## 2018-07-23 ENCOUNTER — GYNECOLOGY VISIT (OUTPATIENT)
Dept: OBGYN | Facility: CLINIC | Age: 28
End: 2018-07-23
Payer: MEDICAID

## 2018-07-23 VITALS
SYSTOLIC BLOOD PRESSURE: 108 MMHG | HEIGHT: 63 IN | WEIGHT: 133 LBS | BODY MASS INDEX: 23.57 KG/M2 | DIASTOLIC BLOOD PRESSURE: 60 MMHG

## 2018-07-23 DIAGNOSIS — F41.8 POSTPARTUM ANXIETY: ICD-10-CM

## 2018-07-23 DIAGNOSIS — M54.50 ACUTE MIDLINE LOW BACK PAIN WITHOUT SCIATICA: ICD-10-CM

## 2018-07-23 DIAGNOSIS — M62.08 RECTUS DIASTASIS: Primary | ICD-10-CM

## 2018-07-23 PROCEDURE — 99213 OFFICE O/P EST LOW 20 MIN: CPT | Performed by: NURSE PRACTITIONER

## 2018-07-23 RX ORDER — ACETAMINOPHEN 500 MG
500-1000 TABLET ORAL EVERY 6 HOURS PRN
COMMUNITY
End: 2023-03-30

## 2018-07-23 RX ORDER — SERTRALINE HYDROCHLORIDE 25 MG/1
25 TABLET, FILM COATED ORAL DAILY
Qty: 30 TAB | Refills: 11 | Status: SHIPPED | OUTPATIENT
Start: 2018-07-23 | End: 2023-03-30

## 2018-07-23 RX ORDER — SERTRALINE HYDROCHLORIDE 25 MG/1
25 TABLET, FILM COATED ORAL DAILY
Qty: 30 TAB | Refills: 11 | Status: SHIPPED | OUTPATIENT
Start: 2018-07-23 | End: 2018-07-23

## 2018-07-23 ASSESSMENT — ENCOUNTER SYMPTOMS
DIAPHORESIS: 0
WEAKNESS: 0
NERVOUS/ANXIOUS: 1
MYALGIAS: 0
MEMORY LOSS: 0
WEIGHT LOSS: 0
CHILLS: 0
GASTROINTESTINAL NEGATIVE: 1
DEPRESSION: 0
RESPIRATORY NEGATIVE: 1
HALLUCINATIONS: 0
NECK PAIN: 0
FEVER: 0
INSOMNIA: 0
FALLS: 0
CARDIOVASCULAR NEGATIVE: 1
BACK PAIN: 1
EYES NEGATIVE: 1

## 2018-07-23 ASSESSMENT — LIFESTYLE VARIABLES: SUBSTANCE_ABUSE: 0

## 2018-07-23 NOTE — PROGRESS NOTES
"Subjective:      Celestina Welch is a 27 y.o. female who presents with Other ( discuss Rectal Diastases)    Celestina is a 27 y.o  who had a  on 17. She had problems during the pregnancy with rectus diastasis and was referred to PCP for follow up. She also has new onset back pain since her epidural was placed (which was a difficult placement). She saw PCP and discussed concerns. She knows that surgery is an option, but would like to try other methods of relief first. US was ordered, and Dr offered pain medication as well as steroids to help with inflammation.  She declines any pain medication, feels like ibuprofen helps at home sometimes. Has tried massage, chiropractic care, and acupuncture. States these give some symptom relief, but it is not long lasting.  She is also complaining of postpartum anxiety and panic attacks. States she got anxiety after her other kids, but not panic attacks. Does have multiple life changes at this time including moving and putting down a family pet. She also has returned to work, and is not sleeping like normal due to the pain.      HPI    Review of Systems   Constitutional: Positive for malaise/fatigue. Negative for chills, diaphoresis, fever and weight loss.   HENT: Negative.    Eyes: Negative.    Respiratory: Negative.    Cardiovascular: Negative.    Gastrointestinal: Negative.    Genitourinary: Negative.    Musculoskeletal: Positive for back pain. Negative for falls, joint pain, myalgias and neck pain.   Skin: Negative.    Neurological: Negative for weakness.   Endo/Heme/Allergies: Negative.    Psychiatric/Behavioral: Negative for depression, hallucinations, memory loss, substance abuse and suicidal ideas. The patient is nervous/anxious. The patient does not have insomnia.    All other systems reviewed and are negative.         Objective:     /60   Ht 1.6 m (5' 3\")   Wt 60.3 kg (133 lb)   LMP 2018   Breastfeeding? Yes   BMI 23.56 kg/m²  "     Physical Exam   Constitutional: She is oriented to person, place, and time. She appears well-developed and well-nourished.   HENT:   Head: Normocephalic.   Nose: Nose normal.   Eyes: Conjunctivae and EOM are normal.   Neck: Normal range of motion. Neck supple.   Cardiovascular: Normal rate, regular rhythm, normal heart sounds and intact distal pulses.    Pulmonary/Chest: Effort normal and breath sounds normal.   Abdominal: Soft. Bowel sounds are normal. She exhibits no distension and no mass. There is tenderness. There is no rebound and no guarding. No hernia.   Musculoskeletal: Normal range of motion. She exhibits tenderness.   Neurological: She is alert and oriented to person, place, and time.   Skin: Skin is warm and dry. Capillary refill takes less than 2 seconds.   Psychiatric: She has a normal mood and affect. Her behavior is normal. Judgment and thought content normal.   Nursing note and vitals reviewed.         Assessment/Plan:     1. Rectus diastasis  2-3 fingerbreadths  - REFERRAL TO PHYSICAL THERAPY Reason for Therapy: Eval/Treat/Report    2. Acute midline low back pain without sciatica  s/p epidural with last delivery  - REFERRAL TO PHYSICAL THERAPY Reason for Therapy: Eval/Treat/Report    3. Postpartum anxiety  With panic attacks  - sertraline (ZOLOFT) 25 MG tablet; Take 1 Tab by mouth every day.  Dispense: 30 Tab; Refill: 11        Discussed all of these treatment options with patient, and she is agreeable. She does have an US ordered from PCP of her abdomen, and she will schedule that today.   Will follow up with PCP or [facility] with concerns or questions.

## 2018-07-23 NOTE — NON-PROVIDER
Pt here to discuss Rectal Diastases  LMP: 7-28-18  Pt is breast feeding  Phone: [phone]  Pt also c/o severe anxiety and panic attacks

## 2018-10-29 ENCOUNTER — TELEPHONE (OUTPATIENT)
Dept: OBGYN | Facility: CLINIC | Age: 28
End: 2018-10-29

## 2018-10-29 NOTE — TELEPHONE ENCOUNTER
Pt called has questions regarding Rx prescribed from [facility].    @ 0291 I called pt back, pt was picking up her son from school and requested to get a call back.    1604 I called pt back but unable to contact, msg left to call back.

## 2019-05-18 ENCOUNTER — APPOINTMENT (OUTPATIENT)
Dept: RADIOLOGY | Facility: MEDICAL CENTER | Age: 29
End: 2019-05-18
Attending: EMERGENCY MEDICINE

## 2019-05-18 ENCOUNTER — HOSPITAL ENCOUNTER (EMERGENCY)
Facility: MEDICAL CENTER | Age: 29
End: 2019-05-18
Attending: EMERGENCY MEDICINE

## 2019-05-18 VITALS
TEMPERATURE: 98.4 F | WEIGHT: 128.97 LBS | BODY MASS INDEX: 22.85 KG/M2 | OXYGEN SATURATION: 96 % | DIASTOLIC BLOOD PRESSURE: 60 MMHG | HEIGHT: 63 IN | RESPIRATION RATE: 18 BRPM | HEART RATE: 80 BPM | SYSTOLIC BLOOD PRESSURE: 118 MMHG

## 2019-05-18 DIAGNOSIS — R20.2 FACIAL PARESTHESIA: ICD-10-CM

## 2019-05-18 PROCEDURE — 70547 MR ANGIOGRAPHY NECK W/O DYE: CPT

## 2019-05-18 PROCEDURE — 99283 EMERGENCY DEPT VISIT LOW MDM: CPT

## 2019-05-18 PROCEDURE — 70551 MRI BRAIN STEM W/O DYE: CPT

## 2019-05-18 NOTE — ED NOTES
"C/O neck pain with facial tingling since earlier today after a chiropractic visit.  As she explains she was manipulated too roughly.  '/74   Pulse 97   Temp 36.9 °C (98.4 °F) (Temporal)   Resp 18   Ht 1.6 m (5' 3\")   Wt 58.5 kg (128 lb 15.5 oz)   LMP 05/11/2019 (Approximate)   SpO2 93%   BMI 22.85 kg/m²   Chief Complaint   Patient presents with   • Neck Pain   • Tingling       "

## 2019-05-18 NOTE — ED PROVIDER NOTES
"ED Provider Note    CHIEF COMPLAINT  Chief Complaint   Patient presents with   • Neck Pain   • Tingling       HPI  Celestina Welch is a 28 y.o. female who presents with right-sided neck pain, facial numbness on the right after being having a neck manipulation by her chiropractor.  She denies any numbness or weakness in her arms or legs.  She denies headache.  She denies any visual symptoms.  The symptoms occurred just after her appointment today.    REVIEW OF SYSTEMS  See HPI for further details. All other systems are negative.     PAST MEDICAL HISTORY   has a past medical history of Patellar tendonitis; Postpartum anxiety (7/23/2018); and Rectus diastasis (10/17/2017).    SOCIAL HISTORY  Social History     Social History Main Topics   • Smoking status: Never Smoker   • Smokeless tobacco: Never Used      Comment: occassional    • Alcohol use Yes      Comment: Occasionally   • Drug use: No   • Sexual activity: Yes     Partners: Male     Birth control/ protection: Pill      Comment: Unplanned pregnancy       SURGICAL HISTORY  patient denies any surgical history    CURRENT MEDICATIONS  The patient denies      ALLERGIES  No Known Allergies    PHYSICAL EXAM  VITAL SIGNS: /74   Pulse 97   Temp 36.9 °C (98.4 °F) (Temporal)   Resp 18   Ht 1.6 m (5' 3\")   Wt 58.5 kg (128 lb 15.5 oz)   LMP 05/11/2019 (Approximate)   SpO2 93%   BMI 22.85 kg/m²  @BLANE[[id number]::@   Pulse ox interpretation: I interpret this pulse ox as normal.  Constitutional: Alert in no apparent distress.  HENT: No signs of trauma, Bilateral external ears normal, Nose normal.   Eyes: Pupils are equal and reactive, Conjunctiva normal, Non-icteric.   Neck: Normal range of motion, No tenderness, Supple, No stridor.   Lymphatic: No lymphadenopathy noted.   Cardiovascular: Regular rate and rhythm, no murmurs.   Thorax & Lungs: Normal breath sounds, No respiratory distress, No wheezing, No chest tenderness.   Abdomen: Bowel sounds normal, Soft, No " tenderness, No masses, No pulsatile masses. No peritoneal signs.  Skin: Warm, Dry, No erythema, No rash.   Extremities: Intact distal pulses, No edema, No tenderness, No cyanosis.  Musculoskeletal: Good range of motion in all major joints. No tenderness to palpation or major deformities noted.   Neurologic: The patient has anisocoria but she says this is baseline for her, she is alert , she has subjective numbness of the right side of the face upon palpation.  She has no facial droop.  Pupils are equal and reactive.  Psychiatric: Affect normal, Judgment normal, Mood normal.       DIAGNOSTIC STUDIES / PROCEDURES        RADIOLOGY  MR-MRA NECK-W/O    (Results Pending)   MR-BRAIN-W/O    (Results Pending)           COURSE & MEDICAL DECISION MAKING  Pertinent Labs & Imaging studies reviewed. (See chart for details)    Differential diagnosis: Carotid dissection, CVA, paresthesia    I inadvertently ordered MRA head instead of MRI head, I spoke with MRI tech and she will change the order.      The patient is not a candidate for IV alteplase for stroke because her symptoms are not clearly indicative of stroke.    The patient's MRI brain and MRA neck are pending, if those results are normal the patient will be discharged to follow-up with her primary care doctor.  She is having paresthesias of her face of unknown cause.  If, however, the MRI or MRA is abnormal Dr. Choudhary will address the problem.         FINAL IMPRESSION  1.  Right-sided facial paresthesia  2.   3.         Electronically signed by: Sushil Ramsey, 5/18/2019 4:11 PM

## 2019-05-19 NOTE — ED PROVIDER NOTES
ED Provider Note    Patient was turned over to me pending MRI results.  MRI wet read results by radiology are negative for any dissection or stroke.  I discussed this with the patient she will be discharged as planned.

## 2019-05-19 NOTE — DISCHARGE INSTRUCTIONS
Paresthesia  Introduction  Paresthesia is a burning or prickling feeling. This feeling can happen in any part of the body. It often happens in the hands, arms, legs, or feet. Usually, it is not painful. In most cases, the feeling goes away in a short time and is not a sign of a serious problem.  Follow these instructions at home:  · Avoid drinking alcohol.  · Try massage or needle therapy (acupuncture) to help with your problems.  · Keep all follow-up visits as told by your doctor. This is important.  Contact a doctor if:  · You keep on having episodes of paresthesia.  · Your burning or prickling feeling gets worse when you walk.  · You have pain or cramps.  · You feel dizzy.  · You have a rash.  Get help right away if:  · You feel weak.  · You have trouble walking or moving.  · You have problems speaking, understanding, or seeing.  · You feel confused.  · You cannot control when you pee (urinate) or poop (bowel movement).  · You lose feeling (numbness) after an injury.  · You pass out (faint).  This information is not intended to replace advice given to you by your health care provider. Make sure you discuss any questions you have with your health care provider.  Document Released: 11/30/2009 Document Revised: 05/25/2017 Document Reviewed: 12/14/2015  © 2017 [employer]vier

## 2019-05-19 NOTE — ED NOTES
Rounded on pt. Pt states numbness has decreased in face, and is only surrounding lips. Pt states neck pain still present but also diminished.

## 2021-09-02 ENCOUNTER — TELEPHONE (OUTPATIENT)
Dept: SCHEDULING | Facility: IMAGING CENTER | Age: 31
End: 2021-09-02

## 2022-08-06 ENCOUNTER — HOSPITAL ENCOUNTER (OUTPATIENT)
Dept: LAB | Facility: MEDICAL CENTER | Age: 32
End: 2022-08-06
Attending: NURSE PRACTITIONER
Payer: COMMERCIAL

## 2022-08-06 LAB
25(OH)D3 SERPL-MCNC: 64 NG/ML (ref 30–100)
ALBUMIN SERPL BCP-MCNC: 5 G/DL (ref 3.2–4.9)
ALBUMIN/GLOB SERPL: 1.7 G/DL
ALP SERPL-CCNC: 90 U/L (ref 30–99)
ALT SERPL-CCNC: 21 U/L (ref 2–50)
ANION GAP SERPL CALC-SCNC: 12 MMOL/L (ref 7–16)
AST SERPL-CCNC: 16 U/L (ref 12–45)
BASOPHILS # BLD AUTO: 0.6 % (ref 0–1.8)
BASOPHILS # BLD: 0.05 K/UL (ref 0–0.12)
BILIRUB SERPL-MCNC: 0.5 MG/DL (ref 0.1–1.5)
BUN SERPL-MCNC: 12 MG/DL (ref 8–22)
CALCIUM SERPL-MCNC: 9.7 MG/DL (ref 8.5–10.5)
CHLORIDE SERPL-SCNC: 103 MMOL/L (ref 96–112)
CO2 SERPL-SCNC: 22 MMOL/L (ref 20–33)
CREAT SERPL-MCNC: 0.73 MG/DL (ref 0.5–1.4)
EOSINOPHIL # BLD AUTO: 0.11 K/UL (ref 0–0.51)
EOSINOPHIL NFR BLD: 1.4 % (ref 0–6.9)
ERYTHROCYTE [DISTWIDTH] IN BLOOD BY AUTOMATED COUNT: 39.7 FL (ref 35.9–50)
ERYTHROCYTE [SEDIMENTATION RATE] IN BLOOD BY WESTERGREN METHOD: 7 MM/HOUR (ref 0–25)
EST. AVERAGE GLUCOSE BLD GHB EST-MCNC: 97 MG/DL
FOLATE SERPL-MCNC: 12.7 NG/ML
GFR SERPLBLD CREATININE-BSD FMLA CKD-EPI: 112 ML/MIN/1.73 M 2
GLOBULIN SER CALC-MCNC: 2.9 G/DL (ref 1.9–3.5)
GLUCOSE SERPL-MCNC: 88 MG/DL (ref 65–99)
HBA1C MFR BLD: 5 % (ref 4–5.6)
HCT VFR BLD AUTO: 46.8 % (ref 37–47)
HGB BLD-MCNC: 15.5 G/DL (ref 12–16)
IMM GRANULOCYTES # BLD AUTO: 0.05 K/UL (ref 0–0.11)
IMM GRANULOCYTES NFR BLD AUTO: 0.6 % (ref 0–0.9)
LYMPHOCYTES # BLD AUTO: 1.92 K/UL (ref 1–4.8)
LYMPHOCYTES NFR BLD: 24.4 % (ref 22–41)
MCH RBC QN AUTO: 28 PG (ref 27–33)
MCHC RBC AUTO-ENTMCNC: 33.1 G/DL (ref 33.6–35)
MCV RBC AUTO: 84.5 FL (ref 81.4–97.8)
MONOCYTES # BLD AUTO: 0.64 K/UL (ref 0–0.85)
MONOCYTES NFR BLD AUTO: 8.1 % (ref 0–13.4)
NEUTROPHILS # BLD AUTO: 5.11 K/UL (ref 2–7.15)
NEUTROPHILS NFR BLD: 64.9 % (ref 44–72)
NRBC # BLD AUTO: 0 K/UL
NRBC BLD-RTO: 0 /100 WBC
PLATELET # BLD AUTO: 308 K/UL (ref 164–446)
PMV BLD AUTO: 10.4 FL (ref 9–12.9)
POTASSIUM SERPL-SCNC: 4 MMOL/L (ref 3.6–5.5)
PROT SERPL-MCNC: 7.9 G/DL (ref 6–8.2)
RBC # BLD AUTO: 5.54 M/UL (ref 4.2–5.4)
SODIUM SERPL-SCNC: 137 MMOL/L (ref 135–145)
T4 FREE SERPL-MCNC: 1.31 NG/DL (ref 0.93–1.7)
THYROPEROXIDASE AB SERPL-ACNC: 10 IU/ML (ref 0–9)
TSH SERPL DL<=0.005 MIU/L-ACNC: 1.25 UIU/ML (ref 0.38–5.33)
VIT B12 SERPL-MCNC: 902 PG/ML (ref 211–911)
WBC # BLD AUTO: 7.9 K/UL (ref 4.8–10.8)

## 2022-08-06 PROCEDURE — 82746 ASSAY OF FOLIC ACID SERUM: CPT

## 2022-08-06 PROCEDURE — 85652 RBC SED RATE AUTOMATED: CPT

## 2022-08-06 PROCEDURE — 84439 ASSAY OF FREE THYROXINE: CPT

## 2022-08-06 PROCEDURE — 82306 VITAMIN D 25 HYDROXY: CPT

## 2022-08-06 PROCEDURE — 86038 ANTINUCLEAR ANTIBODIES: CPT

## 2022-08-06 PROCEDURE — 86376 MICROSOMAL ANTIBODY EACH: CPT

## 2022-08-06 PROCEDURE — 85025 COMPLETE CBC W/AUTO DIFF WBC: CPT

## 2022-08-06 PROCEDURE — 84443 ASSAY THYROID STIM HORMONE: CPT

## 2022-08-06 PROCEDURE — 80053 COMPREHEN METABOLIC PANEL: CPT

## 2022-08-06 PROCEDURE — 83036 HEMOGLOBIN GLYCOSYLATED A1C: CPT

## 2022-08-06 PROCEDURE — 36415 COLL VENOUS BLD VENIPUNCTURE: CPT

## 2022-08-06 PROCEDURE — 82607 VITAMIN B-12: CPT

## 2022-08-08 LAB — NUCLEAR IGG SER QL IA: NORMAL

## 2023-03-28 SDOH — HEALTH STABILITY: PHYSICAL HEALTH: ON AVERAGE, HOW MANY DAYS PER WEEK DO YOU ENGAGE IN MODERATE TO STRENUOUS EXERCISE (LIKE A BRISK WALK)?: 3 DAYS

## 2023-03-28 SDOH — ECONOMIC STABILITY: INCOME INSECURITY: HOW HARD IS IT FOR YOU TO PAY FOR THE VERY BASICS LIKE FOOD, HOUSING, MEDICAL CARE, AND HEATING?: NOT VERY HARD

## 2023-03-28 SDOH — HEALTH STABILITY: MENTAL HEALTH
STRESS IS WHEN SOMEONE FEELS TENSE, NERVOUS, ANXIOUS, OR CAN'T SLEEP AT NIGHT BECAUSE THEIR MIND IS TROUBLED. HOW STRESSED ARE YOU?: ONLY A LITTLE

## 2023-03-28 SDOH — ECONOMIC STABILITY: HOUSING INSECURITY
IN THE LAST 12 MONTHS, WAS THERE A TIME WHEN YOU DID NOT HAVE A STEADY PLACE TO SLEEP OR SLEPT IN A SHELTER (INCLUDING NOW)?: NO

## 2023-03-28 SDOH — ECONOMIC STABILITY: FOOD INSECURITY: WITHIN THE PAST 12 MONTHS, YOU WORRIED THAT YOUR FOOD WOULD RUN OUT BEFORE YOU GOT MONEY TO BUY MORE.: NEVER TRUE

## 2023-03-28 SDOH — ECONOMIC STABILITY: TRANSPORTATION INSECURITY
IN THE PAST 12 MONTHS, HAS THE LACK OF TRANSPORTATION KEPT YOU FROM MEDICAL APPOINTMENTS OR FROM GETTING MEDICATIONS?: NO

## 2023-03-28 SDOH — ECONOMIC STABILITY: INCOME INSECURITY: IN THE LAST 12 MONTHS, WAS THERE A TIME WHEN YOU WERE NOT ABLE TO PAY THE MORTGAGE OR RENT ON TIME?: NO

## 2023-03-28 SDOH — ECONOMIC STABILITY: TRANSPORTATION INSECURITY
IN THE PAST 12 MONTHS, HAS LACK OF TRANSPORTATION KEPT YOU FROM MEETINGS, WORK, OR FROM GETTING THINGS NEEDED FOR DAILY LIVING?: NO

## 2023-03-28 SDOH — ECONOMIC STABILITY: HOUSING INSECURITY: IN THE LAST 12 MONTHS, HOW MANY PLACES HAVE YOU LIVED?: 1

## 2023-03-28 SDOH — HEALTH STABILITY: PHYSICAL HEALTH: ON AVERAGE, HOW MANY MINUTES DO YOU ENGAGE IN EXERCISE AT THIS LEVEL?: 60 MIN

## 2023-03-28 SDOH — ECONOMIC STABILITY: FOOD INSECURITY: WITHIN THE PAST 12 MONTHS, THE FOOD YOU BOUGHT JUST DIDN'T LAST AND YOU DIDN'T HAVE MONEY TO GET MORE.: NEVER TRUE

## 2023-03-28 SDOH — ECONOMIC STABILITY: TRANSPORTATION INSECURITY
IN THE PAST 12 MONTHS, HAS LACK OF RELIABLE TRANSPORTATION KEPT YOU FROM MEDICAL APPOINTMENTS, MEETINGS, WORK OR FROM GETTING THINGS NEEDED FOR DAILY LIVING?: NO

## 2023-03-28 ASSESSMENT — SOCIAL DETERMINANTS OF HEALTH (SDOH)
HOW MANY DRINKS CONTAINING ALCOHOL DO YOU HAVE ON A TYPICAL DAY WHEN YOU ARE DRINKING: PATIENT DOES NOT DRINK
HOW OFTEN DO YOU ATTENT MEETINGS OF THE CLUB OR ORGANIZATION YOU BELONG TO?: MORE THAN 4 TIMES PER YEAR
HOW HARD IS IT FOR YOU TO PAY FOR THE VERY BASICS LIKE FOOD, HOUSING, MEDICAL CARE, AND HEATING?: NOT VERY HARD
HOW OFTEN DO YOU GET TOGETHER WITH FRIENDS OR RELATIVES?: TWICE A WEEK
HOW OFTEN DO YOU GET TOGETHER WITH FRIENDS OR RELATIVES?: TWICE A WEEK
HOW OFTEN DO YOU ATTEND CHURCH OR RELIGIOUS SERVICES?: MORE THAN 4 TIMES PER YEAR
DO YOU BELONG TO ANY CLUBS OR ORGANIZATIONS SUCH AS CHURCH GROUPS UNIONS, FRATERNAL OR ATHLETIC GROUPS, OR SCHOOL GROUPS?: YES
HOW OFTEN DO YOU ATTEND CHURCH OR RELIGIOUS SERVICES?: MORE THAN 4 TIMES PER YEAR
HOW OFTEN DO YOU ATTENT MEETINGS OF THE CLUB OR ORGANIZATION YOU BELONG TO?: MORE THAN 4 TIMES PER YEAR
ARE YOU MARRIED, WIDOWED, DIVORCED, SEPARATED, NEVER MARRIED, OR LIVING WITH A PARTNER?: LIVING WITH PARTNER
WITHIN THE PAST 12 MONTHS, YOU WORRIED THAT YOUR FOOD WOULD RUN OUT BEFORE YOU GOT THE MONEY TO BUY MORE: NEVER TRUE
HOW OFTEN DO YOU HAVE A DRINK CONTAINING ALCOHOL: NEVER
ARE YOU MARRIED, WIDOWED, DIVORCED, SEPARATED, NEVER MARRIED, OR LIVING WITH A PARTNER?: LIVING WITH PARTNER
IN A TYPICAL WEEK, HOW MANY TIMES DO YOU TALK ON THE PHONE WITH FAMILY, FRIENDS, OR NEIGHBORS?: MORE THAN THREE TIMES A WEEK
IN A TYPICAL WEEK, HOW MANY TIMES DO YOU TALK ON THE PHONE WITH FAMILY, FRIENDS, OR NEIGHBORS?: MORE THAN THREE TIMES A WEEK
DO YOU BELONG TO ANY CLUBS OR ORGANIZATIONS SUCH AS CHURCH GROUPS UNIONS, FRATERNAL OR ATHLETIC GROUPS, OR SCHOOL GROUPS?: YES
HOW OFTEN DO YOU HAVE SIX OR MORE DRINKS ON ONE OCCASION: NEVER

## 2023-03-28 ASSESSMENT — LIFESTYLE VARIABLES
HOW MANY STANDARD DRINKS CONTAINING ALCOHOL DO YOU HAVE ON A TYPICAL DAY: PATIENT DOES NOT DRINK
AUDIT-C TOTAL SCORE: 0
SKIP TO QUESTIONS 9-10: 1
HOW OFTEN DO YOU HAVE SIX OR MORE DRINKS ON ONE OCCASION: NEVER
HOW OFTEN DO YOU HAVE A DRINK CONTAINING ALCOHOL: NEVER

## 2023-03-30 ENCOUNTER — OFFICE VISIT (OUTPATIENT)
Dept: MEDICAL GROUP | Facility: IMAGING CENTER | Age: 33
End: 2023-03-30
Payer: COMMERCIAL

## 2023-03-30 VITALS
HEIGHT: 63 IN | BODY MASS INDEX: 24.2 KG/M2 | DIASTOLIC BLOOD PRESSURE: 52 MMHG | TEMPERATURE: 98.7 F | OXYGEN SATURATION: 95 % | HEART RATE: 94 BPM | WEIGHT: 136.6 LBS | SYSTOLIC BLOOD PRESSURE: 94 MMHG

## 2023-03-30 DIAGNOSIS — R76.8 ANTI-TPO ANTIBODIES PRESENT: ICD-10-CM

## 2023-03-30 DIAGNOSIS — Z13.220 SCREENING CHOLESTEROL LEVEL: ICD-10-CM

## 2023-03-30 DIAGNOSIS — Z87.59 HISTORY OF MISCARRIAGE: ICD-10-CM

## 2023-03-30 DIAGNOSIS — Z82.3 FAMILY HISTORY OF CVA: ICD-10-CM

## 2023-03-30 DIAGNOSIS — I73.00 RAYNAUD'S PHENOMENON WITHOUT GANGRENE: ICD-10-CM

## 2023-03-30 DIAGNOSIS — N92.6 IRREGULAR MENSES: ICD-10-CM

## 2023-03-30 DIAGNOSIS — M25.522 LEFT ELBOW PAIN: ICD-10-CM

## 2023-03-30 DIAGNOSIS — M25.529 ELBOW PAIN, UNSPECIFIED LATERALITY: ICD-10-CM

## 2023-03-30 PROBLEM — F41.8 POSTPARTUM ANXIETY: Status: RESOLVED | Noted: 2018-07-23 | Resolved: 2023-03-30

## 2023-03-30 PROBLEM — M54.50 ACUTE MIDLINE LOW BACK PAIN WITHOUT SCIATICA: Status: RESOLVED | Noted: 2018-03-01 | Resolved: 2023-03-30

## 2023-03-30 PROBLEM — M62.08 RECTUS DIASTASIS: Status: RESOLVED | Noted: 2017-10-17 | Resolved: 2023-03-30

## 2023-03-30 PROCEDURE — 99203 OFFICE O/P NEW LOW 30 MIN: CPT | Performed by: PHYSICIAN ASSISTANT

## 2023-03-30 ASSESSMENT — FIBROSIS 4 INDEX: FIB4 SCORE: 0.37

## 2023-03-30 ASSESSMENT — PATIENT HEALTH QUESTIONNAIRE - PHQ9: CLINICAL INTERPRETATION OF PHQ2 SCORE: 0

## 2023-03-30 NOTE — PROGRESS NOTES
Subjective:     CC:   Chief Complaint   Patient presents with    [facility]    Elbow Pain     Been going to PT for elbow pain - has not improved. Requesting possible imaging       HPI:   Celestina presents today to discuss:    Irregular menses  Pt admits to heavy menstrual cycles, typically same time every month, but sometimes a week off. Bleeds for 5 days. Rare cramping. Last pregnancy 2017.     Raynaud's phenomenon without gangrene  Patient with a history of Raynaud's, always has cold fingers.  History of negative JASMIN.    Left elbow pain  Patient admits to chronic left elbow pain.  Has been going to physical therapy without improvement.  No direct injury.  She does overuse the left arm, works as a , and also carries her kids on her left hip most often.  No direct injury to the elbow.    History of miscarriage  Patient states she has had 2 miscarriages.  Admits to a significant family history of strokes on her father side.  States at least 3-4 paternal family members have had one.    Anti-TPO antibodies present  Patient with borderline elevated TPO antibodies.  Normal thyroid function.  Admits to cold intolerance, difficulty losing weight, fatigue, irregular menses, anxiety.      Past Medical History:   Diagnosis Date    Allergy     Anemia     Patellar tendonitis     Postpartum anxiety 07/23/2018    Rectus diastasis 10/17/2017     Family History   Problem Relation Age of Onset    Psychiatric Illness Mother     Diabetes Mother     Other Father     Hypertension Father     Hyperlipidemia Father     Cancer Maternal Grandmother         breast    Breast Cancer Maternal Grandmother     Stroke Paternal Grandfather     Cancer Paternal Grandfather      History reviewed. No pertinent surgical history.  Social History     Tobacco Use    Smoking status: Never    Smokeless tobacco: Never    Tobacco comments:     occassional    Vaping Use    Vaping Use: Never used   Substance Use Topics    Alcohol use: Yes     Comment:  "Occasionally    Drug use: No     Types: Marijuana     Social History     Social History Narrative    Not on file     No current Epic-ordered outpatient medications on file.     No current Epic-ordered facility-administered medications on file.     Patient has no known allergies.    PMH/PSH/FH/Social history reviewed.  Vaccinations discussed.  Previous records and labs reviewed. Discussed age appropriate anticipatory guidance.    ROS: see hpi  Gen: no fevers/chills  Pulm: no sob, no cough  CV: no chest pain, no palpitations, no edema  GI: no nausea/vomiting, no diarrhea  Skin: no rash    Objective:   Exam:  BP 94/52   Pulse 94   Temp 37.1 °C (98.7 °F) (Temporal)   Ht 1.6 m (5' 3\")   Wt 62 kg (136 lb 9.6 oz)   LMP 03/09/2023 (Exact Date)   SpO2 95%   Breastfeeding No   BMI 24.20 kg/m²    Body mass index is 24.2 kg/m².    Gen: Alert and oriented, No apparent distress.  HEENT: Head atraumatic, normocephalic. Pupils equal and round.  Neck: Neck is supple without lymphadenopathy.  Mild thyromegaly.  Lungs: Normal effort, CTA bilaterally, no wheezes, rhonchi, or rales  CV: Regular rate and rhythm. No murmurs, rubs, or gallops.  Ext: No clubbing, cyanosis, edema.  Delayed capillary refill bilateral hands.  Cool to touch.  2+ radial pulses.  Left elbow tender to palpation along medial epicondyle with focal area of swelling.  Full range of motion.    Assessment & Plan:     32 y.o. female with the following -     1. Left elbow pain  Suspect medial epicondylitis, check x-ray and refer to orthopedics for possible steroid injection and further management.  May wear elbow sleeve as tolerated, avoid overuse.  - Referral to Orthopedics  - DX-ELBOW-COMPLETE 3+ LEFT; Future    2. Elbow pain, unspecified laterality  - Referral to Orthopedics    3. Irregular menses  - ESTRADIOL; Future  - FSH/LH; Future  - PROGESTERONE; Future  - CORTISOL; Future  - IRON/TOTAL IRON BIND; Future  - FERRITIN; Future    4. Anti-TPO antibodies " present  - ANTI-THYROID ANTIBODIES; Future  - US-THYROID; Future  - TSH; Future  - FREE THYROXINE; Future    5. Raynaud's phenomenon without gangrene  Wear gloves during cold weather, JASMIN negative.  Blood pressure too low for CCB.    6. Family history of CVA  - Prothrombin Time; Future  - APTT; Future    7. History of miscarriage  - Prothrombin Time; Future  - APTT; Future    8. Screening cholesterol level  - Lipid Profile; Future    Return in about 2 weeks (around 4/13/2023) for Follow-up labs/tests.    My total time spent caring for the patient on the day of the encounter was 30 minutes.   This does not include time spent on separately billable procedures/tests.      Kaela Cardoza PA-C ([place])  Physician Assistant Certified  [facility]    Please note that this dictation was created using voice recognition software. I have made every reasonable attempt to correct obvious errors, but I expect that there are errors of grammar and possibly content that I did not discover before finalizing the note.

## 2023-03-30 NOTE — PATIENT INSTRUCTIONS
It was a pleasure meeting with you today at [facility]!    Your medical history/records and medications were reviewed today.     UPDATE on MyChart Results: If you have blood work, and/or imaging studies, or any other test or procedure completed, you will have access to results as soon as they become available in MyChart. Recently, these results will be available for review at the same time that your provider is able to see results!    This will likely mean you will see a result before your provider has had a chance to review and discuss with you.  Some results or care notes may be hard to understand and may be serious in nature.    We look at every result and your provider will contact you to explain what they mean and discuss appropriate next steps. Please allow for at least 72 business hours for chart and result review.     We prefer that you wait for your care team to contact you with your results.  Often, your provider will discuss your results with you at your next appointment. We look forward to continuing to partner with you in your care.    Please review my practice information below:    If you have any prescription refill requests, please send them via Hyperink or discuss with your provider at the start of your office visits. Please allow 3-5 business days for lab and testing review and you will be contacted via Hyperink with those results, or if advised to make a follow up appointment regarding those results, then please do so.     Once resulted, your lab/test/imaging results will show up automatically in your MyChart. Please wait for my interpretation and recommendations prior to viewing your results to avoid any unnecessary confusion or misinterpretation. I will address all of the lab values that I interpret as abnormal and message you accordingly on your MyChart. I will always send you a message about your results even if they are normal. If you do not hear back from me within 5-7 business  days after completing your tests, then please send me a message on Adeze so I can obtain your results (especially if you went to an outside lab or imaging center - [facility], [facility], etc).     If you have any additional questions or concerns beyond my interpretation of your results, please make an appointment with me to discuss in further detail.    Please only use the Adeze messaging system for questions regarding your most recent appointment or if advised to use otherwise (glucose or blood pressure reporting).     If you have any new problems or concerns, you must make an appointment to discuss. This includes any referral requests, lab requests (unless advised to notify me for pre-appt labs), medication side effects, or request for medication adjustments.     Please arrive 15 minutes prior to your appointment time to complete your check-in and intake with the medical assistant.      Thank you,    Kaela Cardoza PA-C ([place]aker)  Physician Assistant Certified  [facility]    -----------------------------------------------------------------    Attn: Patients of [facility]:    In an effort to continue to provide excellent and efficient care to our patients, it is vital that we continue to use our resources appropriately. With that, this is a reminder that Adeze is used for prescription refill requests, test results, virtual visits, and chart review only.     Any new questions, concerns/conditions, lab/imaging requests, medication adjustments, new prescriptions, or referral requests do require an appointment (virtually or in person), unless discussed otherwise at your most recent appointment.     Thank you for your understanding,    [facility]

## 2023-03-30 NOTE — ASSESSMENT & PLAN NOTE
Patient with borderline elevated TPO antibodies.  Normal thyroid function.  Admits to cold intolerance, difficulty losing weight, fatigue, irregular menses, anxiety.

## 2023-03-30 NOTE — ASSESSMENT & PLAN NOTE
Patient admits to chronic left elbow pain.  Has been going to physical therapy without improvement.  No direct injury.  She does overuse the left arm, works as a , and also carries her kids on her left hip most often.  No direct injury to the elbow.

## 2023-03-30 NOTE — ASSESSMENT & PLAN NOTE
Pt admits to heavy menstrual cycles, typically same time every month, but sometimes a week off. Bleeds for 5 days. Rare cramping. Last pregnancy 2017.

## 2023-03-30 NOTE — ASSESSMENT & PLAN NOTE
Patient states she has had 2 miscarriages.  Admits to a significant family history of strokes on her father side.  States at least 3-4 paternal family members have had one.

## 2023-03-30 NOTE — LETTER
[facility]  Kaela Cardoza P.A.-C.  [address]   [place] [address]  Fax: [phone]   Authorization for Release/Disclosure of   Protected Health Information   Name: RADHA SUMMERS : 1990 SSN: [id number]   Address: [address]  [address]  [place] [address] Phone:    [phone] (home)    I authorize the entity listed below to release/disclose the PHI below to:   [employer] [facility]/Kaela Cardoza P.A.-C. and Kaela Cardoza P.A.-C.   Provider or Entity Name:     Address   City, State, Zip   Phone:      Fax:     Reason for request: continuity of care   Information to be released:    [  ] LAST COLONOSCOPY,  including any PATH REPORT and follow-up  [  ] LAST FIT/COLOGUARD RESULT [  ] LAST DEXA  [  ] LAST MAMMOGRAM  [  ] LAST PAP  [  ] LAST LABS [  ] RETINA EXAM REPORT  [  ] IMMUNIZATION RECORDS  [  ] Release all info      [  ] Check here and initial the line next to each item to release ALL health information INCLUDING  _____ Care and treatment for drug and / or alcohol abuse  _____ HIV testing, infection status, or AIDS  _____ Genetic Testing    DATES OF SERVICE OR TIME PERIOD TO BE DISCLOSED: _____________  I understand and acknowledge that:  * This Authorization may be revoked at any time by you in writing, except if your health information has already been used or disclosed.  * Your health information that will be used or disclosed as a result of you signing this authorization could be re-disclosed by the recipient. If this occurs, your re-disclosed health information may no longer be protected by State or Federal laws.  * You may refuse to sign this Authorization. Your refusal will not affect your ability to obtain treatment.  * This Authorization becomes effective upon signing and will  on (date) __________.      If no date is indicated, this Authorization will  one (1) year from the signature date.    Name: Radha Summers  Signature: Date:   3/30/2023     PLEASE FAX  REQUESTED RECORDS BACK TO: [phone]

## 2023-04-01 ENCOUNTER — HOSPITAL ENCOUNTER (OUTPATIENT)
Dept: LAB | Facility: MEDICAL CENTER | Age: 33
End: 2023-04-01
Attending: PHYSICIAN ASSISTANT
Payer: COMMERCIAL

## 2023-04-01 DIAGNOSIS — R76.8 ANTI-TPO ANTIBODIES PRESENT: ICD-10-CM

## 2023-04-01 DIAGNOSIS — Z13.220 SCREENING CHOLESTEROL LEVEL: ICD-10-CM

## 2023-04-01 DIAGNOSIS — Z82.3 FAMILY HISTORY OF CVA: ICD-10-CM

## 2023-04-01 DIAGNOSIS — Z87.59 HISTORY OF MISCARRIAGE: ICD-10-CM

## 2023-04-01 DIAGNOSIS — N92.6 IRREGULAR MENSES: ICD-10-CM

## 2023-04-01 LAB
APTT PPP: 28.5 SEC (ref 24.7–36)
CHOLEST SERPL-MCNC: 181 MG/DL (ref 100–199)
CORTIS SERPL-MCNC: 16.5 UG/DL (ref 0–23)
ESTRADIOL SERPL-MCNC: 118 PG/ML
FASTING STATUS PATIENT QL REPORTED: NORMAL
FERRITIN SERPL-MCNC: 34.1 NG/ML (ref 10–291)
FSH SERPL-ACNC: 2.6 MIU/ML
HDLC SERPL-MCNC: 47 MG/DL
INR PPP: 0.92 (ref 0.87–1.13)
IRON SATN MFR SERPL: 20 % (ref 15–55)
IRON SERPL-MCNC: 68 UG/DL (ref 40–170)
LDLC SERPL CALC-MCNC: 116 MG/DL
LH SERPL-ACNC: 15.6 IU/L
PROGEST SERPL-MCNC: 6.37 NG/ML
PROTHROMBIN TIME: 12.3 SEC (ref 12–14.6)
T4 FREE SERPL-MCNC: 1.13 NG/DL (ref 0.93–1.7)
TIBC SERPL-MCNC: 346 UG/DL (ref 250–450)
TRIGL SERPL-MCNC: 92 MG/DL (ref 0–149)
TSH SERPL DL<=0.005 MIU/L-ACNC: 2.03 UIU/ML (ref 0.38–5.33)
UIBC SERPL-MCNC: 278 UG/DL (ref 110–370)

## 2023-04-01 PROCEDURE — 84144 ASSAY OF PROGESTERONE: CPT

## 2023-04-01 PROCEDURE — 82533 TOTAL CORTISOL: CPT

## 2023-04-01 PROCEDURE — 83550 IRON BINDING TEST: CPT

## 2023-04-01 PROCEDURE — 84439 ASSAY OF FREE THYROXINE: CPT

## 2023-04-01 PROCEDURE — 85610 PROTHROMBIN TIME: CPT

## 2023-04-01 PROCEDURE — 80061 LIPID PANEL: CPT

## 2023-04-01 PROCEDURE — 83540 ASSAY OF IRON: CPT

## 2023-04-01 PROCEDURE — 85730 THROMBOPLASTIN TIME PARTIAL: CPT

## 2023-04-01 PROCEDURE — 83002 ASSAY OF GONADOTROPIN (LH): CPT

## 2023-04-01 PROCEDURE — 36415 COLL VENOUS BLD VENIPUNCTURE: CPT

## 2023-04-01 PROCEDURE — 84443 ASSAY THYROID STIM HORMONE: CPT

## 2023-04-01 PROCEDURE — 86800 THYROGLOBULIN ANTIBODY: CPT

## 2023-04-01 PROCEDURE — 82670 ASSAY OF TOTAL ESTRADIOL: CPT

## 2023-04-01 PROCEDURE — 82728 ASSAY OF FERRITIN: CPT

## 2023-04-01 PROCEDURE — 86376 MICROSOMAL ANTIBODY EACH: CPT

## 2023-04-01 PROCEDURE — 83001 ASSAY OF GONADOTROPIN (FSH): CPT

## 2023-04-03 LAB
THYROGLOB AB SERPL-ACNC: <0.9 IU/ML (ref 0–4)
THYROPEROXIDASE AB SERPL-ACNC: 0.7 IU/ML (ref 0–9)

## 2023-04-15 ENCOUNTER — APPOINTMENT (OUTPATIENT)
Dept: RADIOLOGY | Facility: MEDICAL CENTER | Age: 33
End: 2023-04-15
Attending: PHYSICIAN ASSISTANT
Payer: COMMERCIAL

## 2023-06-08 ENCOUNTER — HOSPITAL ENCOUNTER (OUTPATIENT)
Dept: RADIOLOGY | Facility: MEDICAL CENTER | Age: 33
End: 2023-06-08
Attending: PHYSICIAN ASSISTANT
Payer: COMMERCIAL

## 2023-06-08 ENCOUNTER — HOSPITAL ENCOUNTER (OUTPATIENT)
Dept: RADIOLOGY | Facility: MEDICAL CENTER | Age: 33
End: 2023-06-08
Attending: CHIROPRACTOR
Payer: COMMERCIAL

## 2023-06-08 DIAGNOSIS — M25.522 LEFT ELBOW PAIN: ICD-10-CM

## 2023-06-08 DIAGNOSIS — S92.301A CLOSED NONDISPLACED FRACTURE OF METATARSAL BONE OF RIGHT FOOT, UNSPECIFIED METATARSAL, INITIAL ENCOUNTER: ICD-10-CM

## 2023-06-08 PROCEDURE — 73080 X-RAY EXAM OF ELBOW: CPT | Mod: LT

## 2023-06-08 PROCEDURE — 73630 X-RAY EXAM OF FOOT: CPT | Mod: LT

## 2023-08-31 ENCOUNTER — OFFICE VISIT (OUTPATIENT)
Dept: MEDICAL GROUP | Facility: IMAGING CENTER | Age: 33
End: 2023-08-31
Payer: COMMERCIAL

## 2023-08-31 VITALS
OXYGEN SATURATION: 100 % | WEIGHT: 133.6 LBS | HEART RATE: 70 BPM | RESPIRATION RATE: 16 BRPM | TEMPERATURE: 97.7 F | BODY MASS INDEX: 23.67 KG/M2 | SYSTOLIC BLOOD PRESSURE: 106 MMHG | HEIGHT: 63 IN | DIASTOLIC BLOOD PRESSURE: 58 MMHG

## 2023-08-31 DIAGNOSIS — B02.9 HERPES ZOSTER WITHOUT COMPLICATION: ICD-10-CM

## 2023-08-31 PROCEDURE — 3074F SYST BP LT 130 MM HG: CPT | Performed by: PHYSICIAN ASSISTANT

## 2023-08-31 PROCEDURE — 3078F DIAST BP <80 MM HG: CPT | Performed by: PHYSICIAN ASSISTANT

## 2023-08-31 PROCEDURE — 99213 OFFICE O/P EST LOW 20 MIN: CPT | Mod: 25 | Performed by: PHYSICIAN ASSISTANT

## 2023-08-31 RX ORDER — VALACYCLOVIR HYDROCHLORIDE 1 G/1
1000 TABLET, FILM COATED ORAL 3 TIMES DAILY
Qty: 30 TABLET | Refills: 0 | Status: SHIPPED | OUTPATIENT
Start: 2023-08-31 | End: 2023-09-01 | Stop reason: SDUPTHER

## 2023-08-31 RX ORDER — KETOROLAC TROMETHAMINE 30 MG/ML
30 INJECTION, SOLUTION INTRAMUSCULAR; INTRAVENOUS ONCE
Status: COMPLETED | OUTPATIENT
Start: 2023-08-31 | End: 2023-08-31

## 2023-08-31 RX ORDER — ACYCLOVIR 50 MG/G
1 OINTMENT TOPICAL EVERY 4 HOURS
Qty: 15 G | Refills: 0 | Status: SHIPPED | OUTPATIENT
Start: 2023-08-31 | End: 2023-09-01 | Stop reason: SDUPTHER

## 2023-08-31 RX ADMIN — KETOROLAC TROMETHAMINE 30 MG: 30 INJECTION, SOLUTION INTRAMUSCULAR; INTRAVENOUS at 14:21

## 2023-08-31 ASSESSMENT — FIBROSIS 4 INDEX: FIB4 SCORE: 0.37

## 2023-08-31 NOTE — PROGRESS NOTES
Subjective:     CC:   Chief Complaint   Patient presents with    Other     Shingles - on her right leg. Pt states she noticed two days ago.        HPI:   Celestina presents today to discuss:    Herpes zoster without complication  Pt admits to shingles rash on her right thigh x 3 days. Has had shingles 3 other times over the past two years. Always in the same spot. No history of PHN.  Admits to irritation, swelling, and tenderness of the thigh.  She is under a lot of stress right now.  Has had excessive sun exposure.  Not sleeping well.      Past Medical History:   Diagnosis Date    Allergy     Anemia     Patellar tendonitis     Postpartum anxiety 07/23/2018    Rectus diastasis 10/17/2017     Family History   Problem Relation Age of Onset    Psychiatric Illness Mother     Diabetes Mother     Other Father     Hypertension Father     Hyperlipidemia Father     Cancer Maternal Grandmother         breast    Breast Cancer Maternal Grandmother     Stroke Paternal Grandfather     Cancer Paternal Grandfather      History reviewed. No pertinent surgical history.  Social History     Tobacco Use    Smoking status: Never    Smokeless tobacco: Never    Tobacco comments:     occassional    Vaping Use    Vaping Use: Never used   Substance Use Topics    Alcohol use: Yes     Comment: Occasionally    Drug use: No     Types: Marijuana     Social History     Social History Narrative    Not on file     Current Outpatient Medications Ordered in Epic   Medication Sig Dispense Refill    valacyclovir (VALTREX) 1 GM Tab Take 1 Tablet by mouth 3 times a day for 10 days. 30 Tablet 0    acyclovir (ZOVIRAX) 5 % Ointment Apply 1 Application topically every 4 hours. 15 g 0     No current Epic-ordered facility-administered medications on file.     Cat hair extract and Cinnamon    PMH/PSH/FH/Social history reviewed.  Vaccinations discussed.  Previous records and labs reviewed. Discussed age appropriate anticipatory guidance.    ROS: see hpi  Gen: no  "fevers/chills  Pulm: no sob, no cough  CV: no chest pain, no palpitations, no edema  GI: no nausea/vomiting, no diarrhea  Skin: no rash    Objective:   Exam:  /58 (BP Location: Left arm, Patient Position: Sitting, BP Cuff Size: Adult)   Pulse 70   Temp 36.5 °C (97.7 °F) (Temporal)   Resp 16   Ht 1.6 m (5' 3\")   Wt 60.6 kg (133 lb 9.6 oz)   LMP 08/26/2023 (Approximate)   SpO2 100%   BMI 23.67 kg/m²    Body mass index is 23.67 kg/m².  Patient's last menstrual period was 08/26/2023 (approximate).    Gen: Alert and oriented, No apparent distress.  HEENT: Head atraumatic, normocephalic. Pupils equal and round.  Neck: Neck is supple without lymphadenopathy.   Lungs: Normal effort, CTA bilaterally, no wheezes, rhonchi, or rales  CV: Regular rate and rhythm. No murmurs, rubs, or gallops.  Ext: No clubbing, cyanosis.  Right anterior distal thigh with cluster of pinpoint vesicles with surrounding erythema and swelling.  Mild warmth.    Assessment & Plan:     32 y.o. female with the following -     1. Herpes zoster without complication  Start antivirals right away.  We will do oral and topical.  Toradol injection due to swelling.  Please keep covered when around those that are immunocompromised (babies, elderly, chronically ill pts) -May use acyclovir ointment, nonstick dressing, Tegaderm.  - valacyclovir (VALTREX) 1 GM Tab; Take 1 Tablet by mouth 3 times a day for 10 days.  Dispense: 30 Tablet; Refill: 0  - ketorolac (Toradol) injection 30 mg  - acyclovir (ZOVIRAX) 5 % Ointment; Apply 1 Application topically every 4 hours.  Dispense: 15 g; Refill: 0    Return if symptoms worsen or fail to improve.    Kaela Chance) [employer]  Physician Assistant Certified  [facility]    Please note that this dictation was created using voice recognition software. I have made every reasonable attempt to correct obvious errors, but I expect that there are errors of grammar and possibly content that I did not " discover before finalizing the note.

## 2023-08-31 NOTE — ASSESSMENT & PLAN NOTE
Pt admits to shingles rash on her right thigh x 3 days. Has had shingles 3 other times over the past two years. Always in the same spot. No history of PHN.  Admits to irritation, swelling, and tenderness of the thigh.  She is under a lot of stress right now.  Has had excessive sun exposure.  Not sleeping well.

## 2023-08-31 NOTE — PATIENT INSTRUCTIONS
It was a pleasure meeting with you today at [facility]!    Your medical history/records and medications were reviewed today.     UPDATE on MyChart Results: If you have blood work, and/or imaging studies, or any other test or procedure completed, you will have access to results as soon as they become available in MyChart. Recently, these results will be available for review at the same time that your provider is able to see results!    This will likely mean you will see a result before your provider has had a chance to review and discuss with you.  Some results or care notes may be hard to understand and may be serious in nature.    We look at every result and your provider will contact you to explain what they mean and discuss appropriate next steps. Please allow for at least 72 business hours for chart and result review.     We prefer that you wait for your care team to contact you with your results.  Often, your provider will discuss your results with you at your next appointment. We look forward to continuing to partner with you in your care.    Please review my practice information below:    If you have any prescription refill requests, please send them via [employer] or discuss with your provider at the start of your office visits. Please allow 3-5 business days for lab and testing review and you will be contacted via [employer] with those results, or if advised to make a follow up appointment regarding those results, then please do so.     Once resulted, your lab/test/imaging results will show up automatically in your MyChart. Please wait for my interpretation and recommendations prior to viewing your results to avoid any unnecessary confusion or misinterpretation. I will address all of the lab values that I interpret as abnormal and message you accordingly on your MyChart. I will always send you a message about your results even if they are normal. If you do not hear back from me within 5-7 business  days after completing your tests, then please send me a message on [employer] so I can obtain your results (especially if you went to an outside lab or imaging center - [facility], [facility], etc).     If you have any additional questions or concerns beyond my interpretation of your results, please make an appointment with me to discuss in further detail.    Please only use the [employer] messaging system for questions regarding your most recent appointment or if advised to use otherwise (glucose or blood pressure reporting).     If you have any new problems or concerns, you must make an appointment to discuss. This includes any referral requests, lab requests (unless advised to notify me for pre-appt labs), medication side effects, or request for medication adjustments.     Please arrive 15 minutes prior to your appointment time to complete your check-in and intake with the medical assistant.      Thank you,    Kaela Cardoza PA-C ([place])  Physician Assistant Certified  [facility]    -----------------------------------------------------------------    Attn: Patients of [facility]:    In an effort to continue to provide excellent and efficient care to our patients, it is vital that we continue to use our resources appropriately. With that, this is a reminder that [employer] is used for prescription refill requests, test results, virtual visits, and chart review only.     Any new questions, concerns/conditions, lab/imaging requests, medication adjustments, new prescriptions, or referral requests do require an appointment (virtually or in person), unless discussed otherwise at your most recent appointment.     Thank you for your understanding,    [facility]

## 2023-09-01 ENCOUNTER — PATIENT MESSAGE (OUTPATIENT)
Dept: MEDICAL GROUP | Facility: IMAGING CENTER | Age: 33
End: 2023-09-01
Payer: COMMERCIAL

## 2023-09-01 DIAGNOSIS — B02.9 HERPES ZOSTER WITHOUT COMPLICATION: ICD-10-CM

## 2023-09-01 RX ORDER — ACYCLOVIR 50 MG/G
1 OINTMENT TOPICAL EVERY 4 HOURS
Qty: 15 G | Refills: 0 | Status: SHIPPED | OUTPATIENT
Start: 2023-09-01 | End: 2023-09-06

## 2023-09-01 RX ORDER — VALACYCLOVIR HYDROCHLORIDE 1 G/1
1000 TABLET, FILM COATED ORAL 3 TIMES DAILY
Qty: 30 TABLET | Refills: 0 | Status: SHIPPED | OUTPATIENT
Start: 2023-09-01 | End: 2023-09-06

## 2023-09-01 NOTE — PATIENT COMMUNICATION
Received request via: Patient    Was the patient seen in the last year in this department? Yes    Does the patient have an active prescription (recently filled or refills available) for medication(s) requested? No    Does the patient have halfway Plus and need 100 day supply (blood pressure, diabetes and cholesterol meds only)? Patient does not have SCP    Pharmacy change

## 2023-09-06 ENCOUNTER — HOSPITAL ENCOUNTER (OUTPATIENT)
Dept: RADIOLOGY | Facility: MEDICAL CENTER | Age: 33
End: 2023-09-06
Attending: PHYSICIAN ASSISTANT
Payer: COMMERCIAL

## 2023-09-06 DIAGNOSIS — R76.8 ANTI-TPO ANTIBODIES PRESENT: ICD-10-CM

## 2023-09-06 DIAGNOSIS — R93.89 THYROID WITH HETEROGENEOUS ECHOTEXTURE DETERMINED BY ULTRASOUND: ICD-10-CM

## 2023-09-06 PROCEDURE — 76536 US EXAM OF HEAD AND NECK: CPT
